# Patient Record
Sex: MALE | Race: BLACK OR AFRICAN AMERICAN | Employment: FULL TIME | URBAN - METROPOLITAN AREA
[De-identification: names, ages, dates, MRNs, and addresses within clinical notes are randomized per-mention and may not be internally consistent; named-entity substitution may affect disease eponyms.]

---

## 2017-07-06 ENCOUNTER — HOSPITAL ENCOUNTER (EMERGENCY)
Age: 22
Discharge: HOME | End: 2017-07-06
Attending: EMERGENCY MEDICINE
Payer: SELF-PAY

## 2017-07-06 VITALS
DIASTOLIC BLOOD PRESSURE: 68 MMHG | HEART RATE: 94 BPM | BODY MASS INDEX: 21.82 KG/M2 | HEIGHT: 68 IN | RESPIRATION RATE: 16 BRPM | OXYGEN SATURATION: 97 % | TEMPERATURE: 98.6 F | WEIGHT: 144 LBS | SYSTOLIC BLOOD PRESSURE: 125 MMHG

## 2017-07-06 DIAGNOSIS — R09.81 NASAL CONGESTION: ICD-10-CM

## 2017-07-06 DIAGNOSIS — J02.9 SORE THROAT: Primary | ICD-10-CM

## 2017-07-06 LAB
DEPRECATED S PYO AG THROAT QL EIA: NEGATIVE
FLUAV AG NPH QL IA: NEGATIVE
FLUBV AG NPH QL IA: NEGATIVE

## 2017-07-06 PROCEDURE — 74011250637 HC RX REV CODE- 250/637: Performed by: EMERGENCY MEDICINE

## 2017-07-06 PROCEDURE — 87081 CULTURE SCREEN ONLY: CPT | Performed by: EMERGENCY MEDICINE

## 2017-07-06 PROCEDURE — 99283 EMERGENCY DEPT VISIT LOW MDM: CPT | Performed by: EMERGENCY MEDICINE

## 2017-07-06 PROCEDURE — 87804 INFLUENZA ASSAY W/OPTIC: CPT | Performed by: EMERGENCY MEDICINE

## 2017-07-06 PROCEDURE — 87880 STREP A ASSAY W/OPTIC: CPT | Performed by: EMERGENCY MEDICINE

## 2017-07-06 RX ORDER — IBUPROFEN 600 MG/1
600 TABLET ORAL ONCE
Status: COMPLETED | OUTPATIENT
Start: 2017-07-06 | End: 2017-07-06

## 2017-07-06 RX ADMIN — IBUPROFEN 600 MG: 600 TABLET ORAL at 20:20

## 2017-07-06 NOTE — LETTER
[facility] EMERGENCY DEPT One [address] [address] 
[phone] Work/School Note Date: 7/6/2017 To Whom It May concern: 
 
Roman Miller was seen and treated today in the emergency room by the following provider(s): 
Attending Provider: Rick Singh MD.   
 
Roman Miller may return to work on 7/7/17. Sincerely, Haydee Noonan RN

## 2017-07-07 NOTE — ED PROVIDER NOTES
HPI Comments: Patient is a 64-year-old male who presents with congestion and sore throat. Patient states symptoms began 2 days ago, gradually worsening, states pain with swallowing, runny nose, mild cough. He denies any chest pain, shortness of breath, no abdominal pain, no nausea or vomiting, no neck pain or stiffness, no further complaints. states chills, however has not taken his temperature    Patient is a 22 y.o. male presenting with nasal congestion. The history is provided by the patient. No  was used. Nasal Congestion   Pertinent negatives include no chest pain, no abdominal pain, no headaches and no shortness of breath. No past medical history on file. No past surgical history on file. No family history on file. Social History     Social History    Marital status: SINGLE     Spouse name: N/A    Number of children: N/A    Years of education: N/A     Occupational History    Not on file. Social History Main Topics    Smoking status: Current Every Day Smoker     Packs/day: 0.25    Smokeless tobacco: Not on file    Alcohol use No    Drug use: No    Sexual activity: No     Other Topics Concern    Not on file     Social History Narrative         ALLERGIES: Review of patient's allergies indicates no known allergies. Review of Systems   Constitutional: Negative for chills and fever. HENT: Positive for congestion, postnasal drip, rhinorrhea and sore throat. Eyes: Negative for visual disturbance. Respiratory: Positive for cough. Negative for shortness of breath. Cardiovascular: Negative for chest pain and leg swelling. Gastrointestinal: Negative for abdominal pain, diarrhea, nausea and vomiting. Genitourinary: Negative for dysuria. Musculoskeletal: Negative for back pain and neck pain. Skin: Negative for rash. Neurological: Negative for weakness and headaches. Psychiatric/Behavioral: The patient is not nervous/anxious.         Vitals: 07/06/17 1835   BP: 124/67   Pulse: (!) 103   Resp: 16   Temp: 98.7 °F (37.1 °C)   SpO2: 96%   Weight: 65.3 kg (144 lb)   Height: 5' 8\" (1.727 m)            Physical Exam   Constitutional: He is oriented to person, place, and time. He appears well-developed and well-nourished. HENT:   Head: Normocephalic. Right Ear: External ear normal.   Left Ear: External ear normal.   bilateral tonsils with swelling and mild exudate, uvula midline, no trismus, no trouble swallowing or handling secretions. Eyes: Conjunctivae and EOM are normal. Pupils are equal, round, and reactive to light. Neck: Normal range of motion. Neck supple. No tracheal deviation present. No pain with range of motion of the neck in all directions   Cardiovascular: Normal rate, regular rhythm, normal heart sounds and intact distal pulses. No murmur heard. Pulmonary/Chest: Effort normal and breath sounds normal. No respiratory distress. Abdominal: Soft. He exhibits no distension. There is no tenderness. There is no rebound. nontender to palpation of abdomen throughout. Musculoskeletal: Normal range of motion. Neurological: He is alert and oriented to person, place, and time. No cranial nerve deficit. Skin: No rash noted. Nursing note and vitals reviewed.        MDM  Number of Diagnoses or Management Options  Nasal congestion: new and requires workup  Sore throat: new and requires workup     Amount and/or Complexity of Data Reviewed  Clinical lab tests: reviewed and ordered  Review and summarize past medical records: yes    Risk of Complications, Morbidity, and/or Mortality  Presenting problems: moderate  Diagnostic procedures: moderate  Management options: moderate    Patient Progress  Patient progress: stable    ED Course       Procedures    Recent Results (from the past 12 hour(s))   STREP AG SCREEN, GROUP A    Collection Time: 07/06/17  7:55 PM   Result Value Ref Range    Group A Strep Ag ID NEGATIVE  NEG     INFLUENZA A & B AG (RAPID TEST)    Collection Time: 07/06/17  7:55 PM   Result Value Ref Range    Influenza A Ag NEGATIVE  NEG      Influenza B Ag NEGATIVE  NEG            26-year-old male with sore throat and nasal congestion:    Patient is very well-appearing, no acute distress, laboratory throughout March from an room, requesting to leave and states \"I just need a doctor's excuse\". Please follow up with his primary care provider in 2-3 days for recheck or return to the emergency department with any fevers or chills, neck pain or stiffness, any chest pain, abdominal pain, nausea or vomiting, or any further concerns.

## 2017-07-07 NOTE — DISCHARGE INSTRUCTIONS
Sore Throat: Care Instructions  Your Care Instructions    Infection by bacteria or a virus causes most sore throats. Cigarette smoke, dry air, air pollution, allergies, and yelling can also cause a sore throat. Sore throats can be painful and annoying. Fortunately, most sore throats go away on their own. If you have a bacterial infection, your doctor may prescribe antibiotics. Follow-up care is a key part of your treatment and safety. Be sure to make and go to all appointments, and call your doctor if you are having problems. It's also a good idea to know your test results and keep a list of the medicines you take. How can you care for yourself at home? · If your doctor prescribed antibiotics, take them as directed. Do not stop taking them just because you feel better. You need to take the full course of antibiotics. · Gargle with warm salt water once an hour to help reduce swelling and relieve discomfort. Use 1 teaspoon of salt mixed in 1 cup of warm water. · Take an over-the-counter pain medicine, such as acetaminophen (Tylenol), ibuprofen (Advil, Motrin), or naproxen (Aleve). Read and follow all instructions on the label. · Be careful when taking over-the-counter cold or flu medicines and Tylenol at the same time. Many of these medicines have acetaminophen, which is Tylenol. Read the labels to make sure that you are not taking more than the recommended dose. Too much acetaminophen (Tylenol) can be harmful. · Drink plenty of fluids. Fluids may help soothe an irritated throat. Hot fluids, such as tea or soup, may help decrease throat pain. · Use over-the-counter throat lozenges to soothe pain. Regular cough drops or hard candy may also help. These should not be given to young children because of the risk of choking. · Do not smoke or allow others to smoke around you. If you need help quitting, talk to your doctor about stop-smoking programs and medicines.  These can increase your chances of quitting for good. · Use a vaporizer or humidifier to add moisture to your bedroom. Follow the directions for cleaning the machine. When should you call for help? Call your doctor now or seek immediate medical care if:  · You have new or worse trouble swallowing. · Your sore throat gets much worse on one side. Watch closely for changes in your health, and be sure to contact your doctor if you do not get better as expected. Where can you learn more? Go to http://royce-tyler.info/. Enter [phone] [id number] [phone] in the search box to learn more about \"Sore Throat: Care Instructions. \"  Current as of: July 29, 2016  Content Version: 11.3  © 0123-5164 [employer]. Care instructions adapted under license by [employer] (which disclaims liability or warranty for this information). If you have questions about a medical condition or this instruction, always ask your healthcare professional. [address] any warranty or liability for your use of this information.

## 2017-07-07 NOTE — ED NOTES
Pt discharged instructions given pt vu to instructions pt in no acute distress at discharge.  Pt ambulatory on discharge

## 2017-07-09 LAB
BACTERIA SPEC CULT: ABNORMAL
SERVICE CMNT-IMP: ABNORMAL

## 2017-07-26 ENCOUNTER — HOSPITAL ENCOUNTER (EMERGENCY)
Age: 22
Discharge: HOME | End: 2017-07-26
Attending: EMERGENCY MEDICINE
Payer: SELF-PAY

## 2017-07-26 VITALS
OXYGEN SATURATION: 97 % | DIASTOLIC BLOOD PRESSURE: 74 MMHG | BODY MASS INDEX: 21.82 KG/M2 | HEART RATE: 80 BPM | TEMPERATURE: 97.8 F | SYSTOLIC BLOOD PRESSURE: 118 MMHG | WEIGHT: 144 LBS | HEIGHT: 68 IN | RESPIRATION RATE: 16 BRPM

## 2017-07-26 DIAGNOSIS — J01.90 ACUTE SINUSITIS, RECURRENCE NOT SPECIFIED, UNSPECIFIED LOCATION: Primary | ICD-10-CM

## 2017-07-26 PROCEDURE — 99282 EMERGENCY DEPT VISIT SF MDM: CPT | Performed by: EMERGENCY MEDICINE

## 2017-07-26 RX ORDER — LORATADINE AND PSEUDOEPHEDRINE 10; 240 MG/1; MG/1
1 TABLET, EXTENDED RELEASE ORAL DAILY
Qty: 10 TAB | Refills: 0 | Status: ON HOLD | OUTPATIENT
Start: 2017-07-26 | End: 2020-03-10

## 2017-07-26 NOTE — LETTER
[facility] [place] [place] [address] [address] 
[phone] Work/School Note Date: 7/26/2017 To Whom It May concern: 
 
Cheryl Forbes was seen and treated today in the emergency room by the following provider(s): 
Attending Provider: Johana Arreola MD.   
 
Cheryl Forbes {Return to school/sport/work: 7/27/2017 Sincerely, Johana Arreola MD

## 2017-07-26 NOTE — ED TRIAGE NOTES
Pt arrives complaining of cold and flu like symptoms. Pt states he took tylenol to help but denies any other medications for relief. Pt states he's had a headache and a stuffy nose. Pt alert and oriented in triage.

## 2017-07-27 NOTE — ED PROVIDER NOTES
HPI Comments: Patient is a 41-year-old male who is otherwise healthy comes to the ER complaining of 2 days of generalized malaise and fatigue. He is also having a lot of sinus pressure and upper respiratory congestion. He states his nose was running yesterday but just feels stuffed up to date. He denies sore throat. He denies cough. He denies fevers. Patient is a 22 y.o. male presenting with nasal congestion. The history is provided by the patient. Nasal Congestion   This is a new problem. The current episode started 2 days ago. The problem occurs constantly. The problem has not changed since onset. Pertinent negatives include no chest pain, no abdominal pain, no headaches and no shortness of breath. The symptoms are aggravated by sneezing and swallowing. He has tried nothing for the symptoms. History reviewed. No pertinent past medical history. History reviewed. No pertinent surgical history. History reviewed. No pertinent family history. Social History     Social History    Marital status: SINGLE     Spouse name: N/A    Number of children: N/A    Years of education: N/A     Occupational History    Not on file. Social History Main Topics    Smoking status: Current Every Day Smoker     Packs/day: 0.25    Smokeless tobacco: Not on file    Alcohol use No    Drug use: No    Sexual activity: No     Other Topics Concern    Not on file     Social History Narrative         ALLERGIES: Review of patient's allergies indicates no known allergies. Review of Systems   Constitutional: Negative for chills and fever. HENT: Positive for congestion, postnasal drip and sinus pressure. Negative for ear discharge, ear pain, facial swelling, mouth sores, nosebleeds and sore throat. Eyes: Negative. Respiratory: Negative for shortness of breath. Cardiovascular: Negative for chest pain. Gastrointestinal: Negative for abdominal pain. Endocrine: Negative. Genitourinary: Negative. Musculoskeletal: Negative. Neurological: Negative for headaches. Vitals:    07/26/17 1930   BP: 118/74   Pulse: 80   Resp: 16   Temp: 97.8 °F (36.6 °C)   SpO2: 97%   Weight: 65.3 kg (144 lb)   Height: 5' 8\" (1.727 m)            Physical Exam   Constitutional: He appears well-developed and well-nourished. HENT:   Head: Normocephalic and atraumatic. Mouth/Throat: No oropharyngeal exudate. Postnasal drainage,  Mild maxillary sinus tenderness   Eyes: EOM are normal. Pupils are equal, round, and reactive to light. Neck: Normal range of motion. Neck supple. Cardiovascular: Normal rate and regular rhythm. Pulmonary/Chest: Effort normal and breath sounds normal.   Abdominal: Soft. Bowel sounds are normal.   Lymphadenopathy:     He has no cervical adenopathy. Skin: Skin is warm and dry. No rash noted. Nursing note and vitals reviewed. MDM  Number of Diagnoses or Management Options  Diagnosis management comments: Differential diagnosis includes upper respiratory infection, viral syndrome, sinusitis, pharyngitis    Risk of Complications, Morbidity, and/or Mortality  Presenting problems: minimal  Diagnostic procedures: minimal  Management options: minimal    Patient Progress  Patient progress: stable    ED Course   Voice dictation software was used during the making of this note. This software is not perfect and grammatical and other typographical errors may be present. This note has been proofread, but may still contain errors.   Alessandra Padilla MD; 7/26/2017 @8:23 PM   ===================================================================        Procedures

## 2017-07-27 NOTE — DISCHARGE INSTRUCTIONS
Sinusitis: Care Instructions  Your Care Instructions    Sinusitis is an infection of the lining of the sinus cavities in your head. Sinusitis often follows a cold. It causes pain and pressure in your head and face. In most cases, sinusitis gets better on its own in 1 to 2 weeks. But some mild symptoms may last for several weeks. Sometimes antibiotics are needed. Follow-up care is a key part of your treatment and safety. Be sure to make and go to all appointments, and call your doctor if you are having problems. It's also a good idea to know your test results and keep a list of the medicines you take. How can you care for yourself at home? · Take an over-the-counter pain medicine, such as acetaminophen (Tylenol), ibuprofen (Advil, Motrin), or naproxen (Aleve). Read and follow all instructions on the label. · If the doctor prescribed antibiotics, take them as directed. Do not stop taking them just because you feel better. You need to take the full course of antibiotics. · Be careful when taking over-the-counter cold or flu medicines and Tylenol at the same time. Many of these medicines have acetaminophen, which is Tylenol. Read the labels to make sure that you are not taking more than the recommended dose. Too much acetaminophen (Tylenol) can be harmful. · Breathe warm, moist air from a steamy shower, a hot bath, or a sink filled with hot water. Avoid cold, dry air. Using a humidifier in your home may help. Follow the directions for cleaning the machine. · Use saline (saltwater) nasal washes to help keep your nasal passages open and wash out mucus and bacteria. You can buy saline nose drops at a grocery store or drugstore. Or you can make your own at home by adding 1 teaspoon of salt and 1 teaspoon of baking soda to 2 cups of distilled water. If you make your own, fill a bulb syringe with the solution, insert the tip into your nostril, and squeeze gently. Darnell Clonts your nose.   · Put a hot, wet towel or a warm gel pack on your face 3 or 4 times a day for 5 to 10 minutes each time. · Try a decongestant nasal spray like oxymetazoline (Afrin). Do not use it for more than 3 days in a row. Using it for more than 3 days can make your congestion worse. When should you call for help? Call your doctor now or seek immediate medical care if:  · You have new or worse swelling or redness in your face or around your eyes. · You have a new or higher fever. Watch closely for changes in your health, and be sure to contact your doctor if:  · You have new or worse facial pain. · The mucus from your nose becomes thicker (like pus) or has new blood in it. · You are not getting better as expected. Where can you learn more? Go to http://royce-tyler.info/. Enter S181 in the search box to learn more about \"Sinusitis: Care Instructions. \"  Current as of: July 29, 2016  Content Version: 11.3  © 1535-8524 [employer]. Care instructions adapted under license by [employer] (which disclaims liability or warranty for this information). If you have questions about a medical condition or this instruction, always ask your healthcare professional. [place] [address] any warranty or liability for your use of this information.

## 2017-07-27 NOTE — ED NOTES
I have reviewed discharge instructions with the patient. Patient verbalizes understanding. Opportunity for questions provided. Prescriptions and work note in hand. Patient ambulatory off the unit. No distress noted at this time.  No e-sign

## 2017-08-02 ENCOUNTER — HOSPITAL ENCOUNTER (EMERGENCY)
Age: 22
Discharge: HOME | End: 2017-08-03
Attending: EMERGENCY MEDICINE
Payer: SELF-PAY

## 2017-08-02 ENCOUNTER — APPOINTMENT (OUTPATIENT)
Dept: GENERAL RADIOLOGY | Age: 22
End: 2017-08-02
Attending: EMERGENCY MEDICINE
Payer: SELF-PAY

## 2017-08-02 DIAGNOSIS — J18.9 COMMUNITY ACQUIRED PNEUMONIA: Primary | ICD-10-CM

## 2017-08-02 PROCEDURE — 74011250636 HC RX REV CODE- 250/636: Performed by: EMERGENCY MEDICINE

## 2017-08-02 PROCEDURE — 81003 URINALYSIS AUTO W/O SCOPE: CPT | Performed by: EMERGENCY MEDICINE

## 2017-08-02 PROCEDURE — 96374 THER/PROPH/DIAG INJ IV PUSH: CPT | Performed by: EMERGENCY MEDICINE

## 2017-08-02 PROCEDURE — 83690 ASSAY OF LIPASE: CPT | Performed by: EMERGENCY MEDICINE

## 2017-08-02 PROCEDURE — 80053 COMPREHEN METABOLIC PANEL: CPT | Performed by: EMERGENCY MEDICINE

## 2017-08-02 PROCEDURE — 99283 EMERGENCY DEPT VISIT LOW MDM: CPT | Performed by: EMERGENCY MEDICINE

## 2017-08-02 PROCEDURE — 71020 XR CHEST PA LAT: CPT

## 2017-08-02 PROCEDURE — 82550 ASSAY OF CK (CPK): CPT | Performed by: EMERGENCY MEDICINE

## 2017-08-02 PROCEDURE — 96361 HYDRATE IV INFUSION ADD-ON: CPT | Performed by: EMERGENCY MEDICINE

## 2017-08-02 PROCEDURE — 85025 COMPLETE CBC W/AUTO DIFF WBC: CPT | Performed by: EMERGENCY MEDICINE

## 2017-08-02 RX ORDER — KETOROLAC TROMETHAMINE 30 MG/ML
30 INJECTION, SOLUTION INTRAMUSCULAR; INTRAVENOUS
Status: COMPLETED | OUTPATIENT
Start: 2017-08-02 | End: 2017-08-02

## 2017-08-02 RX ADMIN — KETOROLAC TROMETHAMINE 30 MG: 30 INJECTION, SOLUTION INTRAMUSCULAR at 23:44

## 2017-08-02 RX ADMIN — SODIUM CHLORIDE 1000 ML: 900 INJECTION, SOLUTION INTRAVENOUS at 23:44

## 2017-08-02 NOTE — LETTER
[facility] EMERGENCY DEPT One [address] [address] 
[phone] Work/School Note Date: 8/2/2017 To Whom It May concern: 
 
Vinny Salas was seen and treated today in the emergency room by the following provider(s): 
Attending Provider: María Simpson MD.   
 
Vinny Salas may return to work on 8/5/17.  
 
Sincerely, 
 
 
 
 
María Simpson MD

## 2017-08-03 VITALS
TEMPERATURE: 99.8 F | BODY MASS INDEX: 21.82 KG/M2 | OXYGEN SATURATION: 99 % | HEART RATE: 79 BPM | DIASTOLIC BLOOD PRESSURE: 71 MMHG | RESPIRATION RATE: 18 BRPM | SYSTOLIC BLOOD PRESSURE: 116 MMHG | WEIGHT: 144 LBS | HEIGHT: 68 IN

## 2017-08-03 LAB
ALBUMIN SERPL BCP-MCNC: 3.7 G/DL (ref 3.5–5)
ALBUMIN/GLOB SERPL: 0.8 {RATIO} (ref 1.2–3.5)
ALP SERPL-CCNC: 70 U/L (ref 50–136)
ALT SERPL-CCNC: 18 U/L (ref 12–65)
ANION GAP BLD CALC-SCNC: 9 MMOL/L (ref 7–16)
AST SERPL W P-5'-P-CCNC: 23 U/L (ref 15–37)
BASOPHILS # BLD AUTO: 0 K/UL (ref 0–0.2)
BASOPHILS # BLD: 0 % (ref 0–2)
BILIRUB SERPL-MCNC: 0.4 MG/DL (ref 0.2–1.1)
BUN SERPL-MCNC: 12 MG/DL (ref 6–23)
CALCIUM SERPL-MCNC: 9 MG/DL (ref 8.3–10.4)
CHLORIDE SERPL-SCNC: 99 MMOL/L (ref 98–107)
CK SERPL-CCNC: 233 U/L (ref 21–215)
CO2 SERPL-SCNC: 30 MMOL/L (ref 21–32)
CREAT SERPL-MCNC: 0.77 MG/DL (ref 0.8–1.5)
DIFFERENTIAL METHOD BLD: ABNORMAL
EOSINOPHIL # BLD: 0.1 K/UL (ref 0–0.8)
EOSINOPHIL NFR BLD: 1 % (ref 0.5–7.8)
ERYTHROCYTE [DISTWIDTH] IN BLOOD BY AUTOMATED COUNT: 14.2 % (ref 11.9–14.6)
GLOBULIN SER CALC-MCNC: 4.6 G/DL (ref 2.3–3.5)
GLUCOSE SERPL-MCNC: 88 MG/DL (ref 65–100)
HCT VFR BLD AUTO: 41.8 % (ref 41.1–50.3)
HGB BLD-MCNC: 14.4 G/DL (ref 13.6–17.2)
IMM GRANULOCYTES # BLD: 0 K/UL (ref 0–0.5)
IMM GRANULOCYTES NFR BLD AUTO: 0.3 % (ref 0–5)
LIPASE SERPL-CCNC: 134 U/L (ref 73–393)
LYMPHOCYTES # BLD AUTO: 26 % (ref 13–44)
LYMPHOCYTES # BLD: 2.1 K/UL (ref 0.5–4.6)
MCH RBC QN AUTO: 28.3 PG (ref 26.1–32.9)
MCHC RBC AUTO-ENTMCNC: 34.4 G/DL (ref 31.4–35)
MCV RBC AUTO: 82.3 FL (ref 79.6–97.8)
MONOCYTES # BLD: 0.8 K/UL (ref 0.1–1.3)
MONOCYTES NFR BLD AUTO: 11 % (ref 4–12)
NEUTS SEG # BLD: 4.9 K/UL (ref 1.7–8.2)
NEUTS SEG NFR BLD AUTO: 62 % (ref 43–78)
PLATELET # BLD AUTO: 258 K/UL (ref 150–450)
PMV BLD AUTO: 10.2 FL (ref 10.8–14.1)
POTASSIUM SERPL-SCNC: 3.8 MMOL/L (ref 3.5–5.1)
PROT SERPL-MCNC: 8.3 G/DL (ref 6.3–8.2)
RBC # BLD AUTO: 5.08 M/UL (ref 4.23–5.67)
SODIUM SERPL-SCNC: 138 MMOL/L (ref 136–145)
WBC # BLD AUTO: 8 K/UL (ref 4.5–13.5)

## 2017-08-03 RX ORDER — AZITHROMYCIN 250 MG/1
TABLET, FILM COATED ORAL
Qty: 6 TAB | Refills: 0 | Status: SHIPPED | OUTPATIENT
Start: 2017-08-03 | End: 2017-08-09

## 2017-08-03 NOTE — ED PROVIDER NOTES
HPI Comments: Patient with increased fatigue and weakness for the past 2 weeks. Occasionally during this time his fingertips on both hands will go numb. Today while stretching he developed some right-sided upper back pain. Sharp in nature and worse with movement and deep breath. Patient is a 22 y.o. male presenting with flank pain. The history is provided by the patient. No  was used. Flank Pain    This is a new problem. The current episode started 6 to 12 hours ago. The problem has not changed since onset. The problem occurs constantly. Patient reports not work related injury. The pain is associated with no known injury. The pain is present in the right side. The quality of the pain is described as sharp. The pain does not radiate. The pain is mild. The symptoms are aggravated by bending, twisting and certain positions. Associated symptoms include weakness. Pertinent negatives include no chest pain, no fever, no numbness, no headaches, no abdominal pain, no abdominal swelling, no bowel incontinence, no perianal numbness, no bladder incontinence, no dysuria, no leg pain and no paresthesias. He has tried nothing for the symptoms. No past medical history on file. No past surgical history on file. No family history on file. Social History     Social History    Marital status: SINGLE     Spouse name: N/A    Number of children: N/A    Years of education: N/A     Occupational History    Not on file. Social History Main Topics    Smoking status: Current Every Day Smoker     Packs/day: 0.25    Smokeless tobacco: Not on file    Alcohol use No    Drug use: No    Sexual activity: No     Other Topics Concern    Not on file     Social History Narrative         ALLERGIES: Review of patient's allergies indicates no known allergies. Review of Systems   Constitutional: Negative for chills and fever. HENT: Negative for rhinorrhea and sore throat.     Eyes: Negative for pain and redness. Respiratory: Negative for chest tightness, shortness of breath and wheezing. Cardiovascular: Negative for chest pain and leg swelling. Gastrointestinal: Negative for abdominal pain, bowel incontinence, diarrhea, nausea and vomiting. Genitourinary: Positive for flank pain. Negative for bladder incontinence, dysuria and hematuria. Musculoskeletal: Positive for back pain. Negative for gait problem, neck pain and neck stiffness. Skin: Negative for color change and rash. Neurological: Positive for weakness. Negative for numbness, headaches and paresthesias. Vitals:    08/02/17 2242   BP: 118/77   Pulse: (!) 107   Resp: 18   Temp: 99.8 °F (37.7 °C)   SpO2: 97%   Weight: 65.3 kg (144 lb)   Height: 5' 8\" (1.727 m)            Physical Exam   Constitutional: He is oriented to person, place, and time. He appears well-developed and well-nourished. HENT:   Head: Normocephalic and atraumatic. Neck: Normal range of motion. Neck supple. Cardiovascular: Normal rate and regular rhythm. No murmur heard. Pulmonary/Chest: Effort normal and breath sounds normal. He has no wheezes. He exhibits no tenderness. Abdominal: Soft. Bowel sounds are normal. There is no tenderness. Musculoskeletal: Normal range of motion. He exhibits no edema or tenderness (no back TTP. ). Neurological: He is alert and oriented to person, place, and time. Skin: Skin is warm and dry. Nursing note and vitals reviewed. MDM  Number of Diagnoses or Management Options  Diagnosis management comments: Right upper lobe pneumonia. Will treat at home.        Amount and/or Complexity of Data Reviewed  Clinical lab tests: ordered and reviewed  Tests in the radiology section of CPT®: ordered and reviewed  Tests in the medicine section of CPT®: ordered and reviewed    Patient Progress  Patient progress: stable    ED Course       Procedures      Results Include:    Recent Results (from the past 24 hour(s))   CBC WITH AUTOMATED DIFF    Collection Time: 08/02/17 11:37 PM   Result Value Ref Range    WBC 8.0 4.5 - 13.5 K/uL    RBC 5.08 4.23 - 5.67 M/uL    HGB 14.4 13.6 - 17.2 g/dL    HCT 41.8 41.1 - 50.3 %    MCV 82.3 79.6 - 97.8 FL    MCH 28.3 26.1 - 32.9 PG    MCHC 34.4 31.4 - 35.0 g/dL    RDW 14.2 11.9 - 14.6 %    PLATELET 900 548 - 089 K/uL    MPV 10.2 (L) 10.8 - 14.1 FL    DF AUTOMATED      NEUTROPHILS 62 43 - 78 %    LYMPHOCYTES 26 13 - 44 %    MONOCYTES 11 4.0 - 12.0 %    EOSINOPHILS 1 0.5 - 7.8 %    BASOPHILS 0 0.0 - 2.0 %    IMMATURE GRANULOCYTES 0.3 0.0 - 5.0 %    ABS. NEUTROPHILS 4.9 1.7 - 8.2 K/UL    ABS. LYMPHOCYTES 2.1 0.5 - 4.6 K/UL    ABS. MONOCYTES 0.8 0.1 - 1.3 K/UL    ABS. EOSINOPHILS 0.1 0.0 - 0.8 K/UL    ABS. BASOPHILS 0.0 0.0 - 0.2 K/UL    ABS. IMM. GRANS. 0.0 0.0 - 0.5 K/UL   METABOLIC PANEL, COMPREHENSIVE    Collection Time: 08/02/17 11:37 PM   Result Value Ref Range    Sodium 138 136 - 145 mmol/L    Potassium 3.8 3.5 - 5.1 mmol/L    Chloride 99 98 - 107 mmol/L    CO2 30 21 - 32 mmol/L    Anion gap 9 7 - 16 mmol/L    Glucose 88 65 - 100 mg/dL    BUN 12 6 - 23 MG/DL    Creatinine 0.77 (L) 0.8 - 1.5 MG/DL    GFR est AA >60 >60 ml/min/1.73m2    GFR est non-AA >60 >60 ml/min/1.73m2    Calcium 9.0 8.3 - 10.4 MG/DL    Bilirubin, total 0.4 0.2 - 1.1 MG/DL    ALT (SGPT) 18 12 - 65 U/L    AST (SGOT) 23 15 - 37 U/L    Alk. phosphatase 70 50 - 136 U/L    Protein, total 8.3 (H) 6.3 - 8.2 g/dL    Albumin 3.7 3.5 - 5.0 g/dL    Globulin 4.6 (H) 2.3 - 3.5 g/dL    A-G Ratio 0.8 (L) 1.2 - 3.5     CK    Collection Time: 08/02/17 11:37 PM   Result Value Ref Range     (H) 21 - 215 U/L   LIPASE    Collection Time: 08/02/17 11:37 PM   Result Value Ref Range    Lipase 134 73 - 393 U/L     XR CHEST PA LAT (Final result) Result time: 08/03/17 00:50:36     Final result by Jeanette Hawkins MD (08/03/17 00:50:36)     Narrative:     EXAM:  XR CHEST PA LAT    INDICATION:   right sided pain    COMPARISON: None.     FINDINGS: PA and lateral radiographs of the chest demonstrate faint airspace  disease in the right upper lobe. The cardiac and mediastinal contours and  pulmonary vascularity are normal.  The bones and soft tissues are within normal  limits. IMPRESSION: Small focus of right upper lobe atelectasis or pneumonia.

## 2017-08-03 NOTE — DISCHARGE INSTRUCTIONS
Pneumonia: Care Instructions  Your Care Instructions    Pneumonia is an infection of the lungs. Most cases are caused by infections from bacteria or viruses. Pneumonia may be mild or very severe. If it is caused by bacteria, you will be treated with antibiotics. It may take a few weeks to a few months to recover fully from pneumonia, depending on how sick you were and whether your overall health is good. Follow-up care is a key part of your treatment and safety. Be sure to make and go to all appointments, and call your doctor if you are having problems. Its also a good idea to know your test results and keep a list of the medicines you take. How can you care for yourself at home? · Take your antibiotics exactly as directed. Do not stop taking the medicine just because you are feeling better. You need to take the full course of antibiotics. · Take your medicines exactly as prescribed. Call your doctor if you think you are having a problem with your medicine. · Get plenty of rest and sleep. You may feel weak and tired for a while, but your energy level will improve with time. · To prevent dehydration, drink plenty of fluids, enough so that your urine is light yellow or clear like water. Choose water and other caffeine-free clear liquids until you feel better. If you have kidney, heart, or liver disease and have to limit fluids, talk with your doctor before you increase the amount of fluids you drink. · Take care of your cough so you can rest. A cough that brings up mucus from your lungs is common with pneumonia. It is one way your body gets rid of the infection. But if coughing keeps you from resting or causes severe fatigue and chest-wall pain, talk to your doctor. He or she may suggest that you take a medicine to reduce the cough. · Use a vaporizer or humidifier to add moisture to your bedroom. Follow the directions for cleaning the machine. · Do not smoke or allow others to smoke around you.  Smoke will make your cough last longer. If you need help quitting, talk to your doctor about stop-smoking programs and medicines. These can increase your chances of quitting for good. · Take an over-the-counter pain medicine, such as acetaminophen (Tylenol), ibuprofen (Advil, Motrin), or naproxen (Aleve). Read and follow all instructions on the label. · Do not take two or more pain medicines at the same time unless the doctor told you to. Many pain medicines have acetaminophen, which is Tylenol. Too much acetaminophen (Tylenol) can be harmful. · If you were given a spirometer to measure how well your lungs are working, use it as instructed. This can help your doctor tell how your recovery is going. · To prevent pneumonia in the future, talk to your doctor about getting a flu vaccine (once a year) and a pneumococcal vaccine (one time only for most people). When should you call for help? Call 911 anytime you think you may need emergency care. For example, call if:  · You have severe trouble breathing. Call your doctor now or seek immediate medical care if:  · You cough up dark brown or bloody mucus (sputum). · You have new or worse trouble breathing. · You are dizzy or lightheaded, or you feel like you may faint. Watch closely for changes in your health, and be sure to contact your doctor if:  · You have a new or higher fever. · You are coughing more deeply or more often. · You are not getting better after 2 days (48 hours). · You do not get better as expected. Where can you learn more? Go to http://royce-tyler.info/. Enter [id number] in the search box to learn more about \"Pneumonia: Care Instructions. \"  Current as of: March 25, 2017  Content Version: 11.3  © 0829-5423 [employer]. Care instructions adapted under license by [employer] (which disclaims liability or warranty for this information).  If you have questions about a medical condition or this instruction, always ask your healthcare professional. [place] [address] any warranty or liability for your use of this information.

## 2017-08-03 NOTE — ED NOTES
I have reviewed medications, follow up provider options, and discharge instructions with the patient. The patient verbalized understanding. Copy of discharge information given to patient upon discharge. Prescription(s) given to patient. Patient discharged in no distress. Patient ambulatory to waiting area. No questions at this time.

## 2017-08-03 NOTE — ED NOTES
Patient awake, A&O x3. Patient states he was trying to stretch out his back today and it has been hurting since. Right upper back pain that is sharp with movement. Patient states some SOB, LS clear. Patient denies chest pain, N/V/D.  Patient also states he has felt weak for approx 2 weeks and sometimes his fingertips go numb

## 2017-08-09 ENCOUNTER — HOSPITAL ENCOUNTER (EMERGENCY)
Age: 22
Discharge: HOME | End: 2017-08-09
Attending: EMERGENCY MEDICINE
Payer: SELF-PAY

## 2017-08-09 ENCOUNTER — APPOINTMENT (OUTPATIENT)
Dept: GENERAL RADIOLOGY | Age: 22
End: 2017-08-09
Attending: EMERGENCY MEDICINE
Payer: SELF-PAY

## 2017-08-09 VITALS
BODY MASS INDEX: 21.82 KG/M2 | HEART RATE: 74 BPM | RESPIRATION RATE: 20 BRPM | DIASTOLIC BLOOD PRESSURE: 84 MMHG | WEIGHT: 144 LBS | OXYGEN SATURATION: 95 % | TEMPERATURE: 98 F | SYSTOLIC BLOOD PRESSURE: 128 MMHG | HEIGHT: 68 IN

## 2017-08-09 DIAGNOSIS — J18.9 COMMUNITY ACQUIRED PNEUMONIA: Primary | ICD-10-CM

## 2017-08-09 LAB
ALBUMIN SERPL BCP-MCNC: 3.6 G/DL (ref 3.5–5)
ALBUMIN/GLOB SERPL: 0.7 {RATIO} (ref 1.2–3.5)
ALP SERPL-CCNC: 72 U/L (ref 50–136)
ALT SERPL-CCNC: 18 U/L (ref 12–65)
ANION GAP BLD CALC-SCNC: 13 MMOL/L (ref 7–16)
AST SERPL W P-5'-P-CCNC: 32 U/L (ref 15–37)
BASOPHILS # BLD AUTO: 0 K/UL (ref 0–0.2)
BASOPHILS # BLD: 0 % (ref 0–2)
BILIRUB SERPL-MCNC: 0.3 MG/DL (ref 0.2–1.1)
BUN SERPL-MCNC: 18 MG/DL (ref 6–23)
CALCIUM SERPL-MCNC: 8.9 MG/DL (ref 8.3–10.4)
CHLORIDE SERPL-SCNC: 98 MMOL/L (ref 98–107)
CO2 SERPL-SCNC: 25 MMOL/L (ref 21–32)
CREAT SERPL-MCNC: 0.7 MG/DL (ref 0.8–1.5)
DIFFERENTIAL METHOD BLD: ABNORMAL
EOSINOPHIL # BLD: 0.1 K/UL (ref 0–0.8)
EOSINOPHIL NFR BLD: 1 % (ref 0.5–7.8)
ERYTHROCYTE [DISTWIDTH] IN BLOOD BY AUTOMATED COUNT: 13.8 % (ref 11.9–14.6)
GLOBULIN SER CALC-MCNC: 5.2 G/DL (ref 2.3–3.5)
GLUCOSE SERPL-MCNC: 89 MG/DL (ref 65–100)
HCT VFR BLD AUTO: 43.8 % (ref 41.1–50.3)
HGB BLD-MCNC: 15.1 G/DL (ref 13.6–17.2)
IMM GRANULOCYTES # BLD: 0 K/UL (ref 0–0.5)
IMM GRANULOCYTES NFR BLD AUTO: 0.3 % (ref 0–5)
LYMPHOCYTES # BLD AUTO: 30 % (ref 13–44)
LYMPHOCYTES # BLD: 2.3 K/UL (ref 0.5–4.6)
MAGNESIUM SERPL-MCNC: 2.2 MG/DL (ref 1.8–2.4)
MCH RBC QN AUTO: 28.4 PG (ref 26.1–32.9)
MCHC RBC AUTO-ENTMCNC: 34.5 G/DL (ref 31.4–35)
MCV RBC AUTO: 82.5 FL (ref 79.6–97.8)
MONOCYTES # BLD: 0.7 K/UL (ref 0.1–1.3)
MONOCYTES NFR BLD AUTO: 8 % (ref 4–12)
NEUTS SEG # BLD: 4.7 K/UL (ref 1.7–8.2)
NEUTS SEG NFR BLD AUTO: 61 % (ref 43–78)
PLATELET # BLD AUTO: 312 K/UL (ref 150–450)
PMV BLD AUTO: 9.9 FL (ref 10.8–14.1)
POTASSIUM SERPL-SCNC: 3.9 MMOL/L (ref 3.5–5.1)
PROT SERPL-MCNC: 8.8 G/DL (ref 6.3–8.2)
RBC # BLD AUTO: 5.31 M/UL (ref 4.23–5.67)
SODIUM SERPL-SCNC: 136 MMOL/L (ref 136–145)
TROPONIN I SERPL-MCNC: <0.02 NG/ML (ref 0.02–0.05)
WBC # BLD AUTO: 7.8 K/UL (ref 4.5–13.5)

## 2017-08-09 PROCEDURE — 83735 ASSAY OF MAGNESIUM: CPT | Performed by: EMERGENCY MEDICINE

## 2017-08-09 PROCEDURE — 96360 HYDRATION IV INFUSION INIT: CPT | Performed by: EMERGENCY MEDICINE

## 2017-08-09 PROCEDURE — 85025 COMPLETE CBC W/AUTO DIFF WBC: CPT | Performed by: EMERGENCY MEDICINE

## 2017-08-09 PROCEDURE — 84484 ASSAY OF TROPONIN QUANT: CPT | Performed by: EMERGENCY MEDICINE

## 2017-08-09 PROCEDURE — 80053 COMPREHEN METABOLIC PANEL: CPT | Performed by: EMERGENCY MEDICINE

## 2017-08-09 PROCEDURE — 74011250637 HC RX REV CODE- 250/637: Performed by: EMERGENCY MEDICINE

## 2017-08-09 PROCEDURE — 71020 XR CHEST PA LAT: CPT

## 2017-08-09 PROCEDURE — 74011250636 HC RX REV CODE- 250/636: Performed by: EMERGENCY MEDICINE

## 2017-08-09 PROCEDURE — 93005 ELECTROCARDIOGRAM TRACING: CPT | Performed by: EMERGENCY MEDICINE

## 2017-08-09 PROCEDURE — 99284 EMERGENCY DEPT VISIT MOD MDM: CPT | Performed by: EMERGENCY MEDICINE

## 2017-08-09 RX ORDER — ACETAMINOPHEN 325 MG/1
650 TABLET ORAL
Status: COMPLETED | OUTPATIENT
Start: 2017-08-09 | End: 2017-08-09

## 2017-08-09 RX ORDER — SULFAMETHOXAZOLE AND TRIMETHOPRIM 800; 160 MG/1; MG/1
1 TABLET ORAL 2 TIMES DAILY
Qty: 14 TAB | Refills: 0 | Status: SHIPPED | OUTPATIENT
Start: 2017-08-09 | End: 2017-08-16

## 2017-08-09 RX ORDER — HYDROCODONE BITARTRATE AND HOMATROPINE METHYLBROMIDE 1.5; 5 MG/5ML; MG/5ML
5 SYRUP ORAL 4 TIMES DAILY
Qty: 120 ML | Refills: 0 | Status: ON HOLD | OUTPATIENT
Start: 2017-08-09 | End: 2020-03-10

## 2017-08-09 RX ADMIN — ACETAMINOPHEN 650 MG: 325 TABLET, FILM COATED ORAL at 21:59

## 2017-08-09 RX ADMIN — SODIUM CHLORIDE 1000 ML: 900 INJECTION, SOLUTION INTRAVENOUS at 20:46

## 2017-08-09 NOTE — Clinical Note
Discontinue azithromycin Call and arrange follow-up with a regular physician Take medications as prescribed Drink plenty of fluids Return to the ER for any new or worsening symptoms

## 2017-08-09 NOTE — LETTER
[facility] [facility] EMERGENCY DEPT [address] [address] 
[phone] Work/School Note Date: 8/9/2017 To Whom It May concern: 
 
Severo Palacios was seen and treated today in the emergency room by the following provider(s): 
Attending Provider: Adelina Mckeon MD.   
 
Severo Palacios may return to work on Monday August 14th or when cleared by primary care physician with whom he is following up. Sincerely, Ciro Hall RN

## 2017-08-10 LAB
ATRIAL RATE: 87 BPM
CALCULATED P AXIS, ECG09: 86 DEGREES
CALCULATED R AXIS, ECG10: 89 DEGREES
CALCULATED T AXIS, ECG11: 73 DEGREES
DIAGNOSIS, 93000: NORMAL
P-R INTERVAL, ECG05: 142 MS
Q-T INTERVAL, ECG07: 330 MS
QRS DURATION, ECG06: 84 MS
QTC CALCULATION (BEZET), ECG08: 397 MS
VENTRICULAR RATE, ECG03: 87 BPM

## 2017-08-10 NOTE — ED PROVIDER NOTES
HPI Comments: Patient presents to the ER complaining of generalized fatigue and right-sided chest pain. Patient reports he's had issues with fatigue and malaise as well as numbness and tingling in his hands for over 3 weeks. Was seen in the ER approximately 1 week ago and diagnosed with a right-sided pneumonia. States he's been taking antibiotics. Denies any significant cough or fevers. States some pain in his right side of her chest with deep breathing. Patient is a 22 y.o. male presenting with fatigue. The history is provided by the patient. Fatigue   This is a recurrent problem. The current episode started more than 1 week ago. The problem has not changed since onset. Pertinent negatives include no focal weakness, no speech difficulty, no movement disorder, no agitation and no unresponsiveness. Pertinent negatives include no shortness of breath, no nausea and no bladder incontinence. History reviewed. No pertinent past medical history. History reviewed. No pertinent surgical history. History reviewed. No pertinent family history. Social History     Social History    Marital status: SINGLE     Spouse name: N/A    Number of children: N/A    Years of education: N/A     Occupational History    Not on file. Social History Main Topics    Smoking status: Current Every Day Smoker     Packs/day: 0.25    Smokeless tobacco: Never Used    Alcohol use No    Drug use: Yes     Special: Marijuana    Sexual activity: No     Other Topics Concern    Not on file     Social History Narrative         ALLERGIES: Review of patient's allergies indicates no known allergies. Review of Systems   Constitutional: Positive for fatigue. HENT: Negative for congestion and dental problem. Eyes: Negative for photophobia, redness and visual disturbance. Respiratory: Negative for chest tightness, shortness of breath and stridor. Cardiovascular: Negative for palpitations and leg swelling. Gastrointestinal: Negative for abdominal pain and nausea. Endocrine: Negative for polydipsia, polyphagia and polyuria. Genitourinary: Negative for bladder incontinence, flank pain, frequency and urgency. Musculoskeletal: Negative for back pain, gait problem and neck pain. Skin: Negative for pallor. Allergic/Immunologic: Negative for food allergies and immunocompromised state. Neurological: Positive for numbness. Negative for focal weakness, speech difficulty and light-headedness. Hematological: Negative for adenopathy. Does not bruise/bleed easily. Psychiatric/Behavioral: Negative for agitation. All other systems reviewed and are negative. Vitals:    08/09/17 2000 08/09/17 2026 08/09/17 2027   BP: 127/75 125/81    Pulse: 91     Resp: 16     Temp: 98.8 °F (37.1 °C)     SpO2: 98%  97%   Weight: 65.3 kg (144 lb)     Height: 5' 8\" (1.727 m)              Physical Exam   Constitutional: He is oriented to person, place, and time. He appears well-developed and well-nourished. HENT:   Head: Normocephalic and atraumatic. Mouth/Throat: Oropharynx is clear and moist.   Eyes: Conjunctivae and EOM are normal. Pupils are equal, round, and reactive to light. Neck: Normal range of motion. Neck supple. No tracheal deviation present. No thyromegaly present. Cardiovascular: Normal rate, regular rhythm, normal heart sounds and intact distal pulses. Exam reveals no gallop and no friction rub. No murmur heard. Pulmonary/Chest: Effort normal and breath sounds normal. He has no wheezes. Abdominal: Soft. Bowel sounds are normal. He exhibits no distension. There is no tenderness. Musculoskeletal: Normal range of motion. He exhibits no edema or deformity. Neurological: He is alert and oriented to person, place, and time. He has normal reflexes. No cranial nerve deficit. Nursing note and vitals reviewed.        MDM  Number of Diagnoses or Management Options  Diagnosis management comments: Patient is not tachycardic or hypoxic here  Will repeat chest x-ray here to rule out worsening pneumonia, effusion or volume overload    9:42 PM  Chest x-ray does reveal a patchy right upper lobe infiltrate. Appears about the same compared to previous chest x-ray. Discussing the patient's history,  Nurse reports patient did tell him that he is known to be HIV positive. We'll change antibiotics from azithromycin to Bactrim. Vitals remained stable including no hypoxia, tachycardia or tachypnea  Ultimately, patient needs follow-up with her primary care physician as well as likely needs an infectious disease doctor           Amount and/or Complexity of Data Reviewed  Clinical lab tests: ordered and reviewed  Tests in the radiology section of CPT®: ordered and reviewed    Risk of Complications, Morbidity, and/or Mortality  Presenting problems: moderate  Diagnostic procedures: moderate  Management options: moderate    Patient Progress  Patient progress: stable    ED Course       Procedures           Results Include:    Recent Results (from the past 24 hour(s))   CBC WITH AUTOMATED DIFF    Collection Time: 08/09/17  8:20 PM   Result Value Ref Range    WBC 7.8 4.5 - 13.5 K/uL    RBC 5.31 4.23 - 5.67 M/uL    HGB 15.1 13.6 - 17.2 g/dL    HCT 43.8 41.1 - 50.3 %    MCV 82.5 79.6 - 97.8 FL    MCH 28.4 26.1 - 32.9 PG    MCHC 34.5 31.4 - 35.0 g/dL    RDW 13.8 11.9 - 14.6 %    PLATELET 756 539 - 772 K/uL    MPV 9.9 (L) 10.8 - 14.1 FL    DF AUTOMATED      NEUTROPHILS 61 43 - 78 %    LYMPHOCYTES 30 13 - 44 %    MONOCYTES 8 4.0 - 12.0 %    EOSINOPHILS 1 0.5 - 7.8 %    BASOPHILS 0 0.0 - 2.0 %    IMMATURE GRANULOCYTES 0.3 0.0 - 5.0 %    ABS. NEUTROPHILS 4.7 1.7 - 8.2 K/UL    ABS. LYMPHOCYTES 2.3 0.5 - 4.6 K/UL    ABS. MONOCYTES 0.7 0.1 - 1.3 K/UL    ABS. EOSINOPHILS 0.1 0.0 - 0.8 K/UL    ABS. BASOPHILS 0.0 0.0 - 0.2 K/UL    ABS. IMM.  GRANS. 0.0 0.0 - 0.5 K/UL   METABOLIC PANEL, COMPREHENSIVE    Collection Time: 08/09/17  8:20 PM   Result Value Ref Range    Sodium 136 136 - 145 mmol/L    Potassium 3.9 3.5 - 5.1 mmol/L    Chloride 98 98 - 107 mmol/L    CO2 25 21 - 32 mmol/L    Anion gap 13 7 - 16 mmol/L    Glucose 89 65 - 100 mg/dL    BUN 18 6 - 23 MG/DL    Creatinine 0.70 (L) 0.8 - 1.5 MG/DL    GFR est AA >60 >60 ml/min/1.73m2    GFR est non-AA >60 >60 ml/min/1.73m2    Calcium 8.9 8.3 - 10.4 MG/DL    Bilirubin, total 0.3 0.2 - 1.1 MG/DL    ALT (SGPT) 18 12 - 65 U/L    AST (SGOT) 32 15 - 37 U/L    Alk.  phosphatase 72 50 - 136 U/L    Protein, total 8.8 (H) 6.3 - 8.2 g/dL    Albumin 3.6 3.5 - 5.0 g/dL    Globulin 5.2 (H) 2.3 - 3.5 g/dL    A-G Ratio 0.7 (L) 1.2 - 3.5     TROPONIN I    Collection Time: 08/09/17  8:20 PM   Result Value Ref Range    Troponin-I, Qt. <0.02 (L) 0.02 - 0.05 NG/ML

## 2017-08-10 NOTE — ED NOTES
I have reviewed discharge instructions with the patient. The patient verbalized understanding. Patient to follow up with PMD as referred and RTED with any changes/concerns. Patient expresses understanding. Patient ambulatory from ED in NAD with Rx x 2. Patient advised that they received medications (either in ED or by Rx) which could cause them to be somnolent. Patient advised that they shouldn't drive or operate machinery and should use caution to avoid falls while under the effects (8-12 hours after last dosage) of said medicine.

## 2017-08-10 NOTE — DISCHARGE INSTRUCTIONS
Your Emergency Department Team appreciates your trust in us. It was unfortunate you had to seek care today, but we are glad you choose our Hospital for your Emergency Medical needs. We strive to provide excellent care. Our goal is to exceed your expectations. If you are not satisfied with your care today, please let the treatment team know before you leave the facility. We would like the chance to rectify any problems now, if possible. Medicine is an imperfect science. Even in the most careful hands, a patients condition may change; or new unexpected symptoms may develop. Should your symptoms worsen or new problems develop, seek follow up care right away. If your symptoms are severe, such as severe pain, shortness of breath, unexplained fever, chest pain, severe abdominal pain, or any serious symptom, please return to THIS Emergency Department immediately. If the Emergency Department team wants you to follow up with a specialist, his or her contact information will follow on the discharge instructions. Of course, your primary care provider (or Family Doctor) will be a great resource for your medical needs. We encourage you to seek follow up care in the near future. Even if you are to see a specialist, your family physician will want to be involved in your medical care. Should you not have a primary care physician; the Emergency Department will provide you with the name and contact information of an appropriate physician. It will be your responsibility to contact the on call physician's office and arrange an appointment. This physician might not participate in your insurance plan. If not, other resources are listed.     Other resources include these local clinics:    [facility] [phone]   [facility] [address]   [facility] [place] [address] [place] [address] [address] [facility] for [address] Medicaid Office Devin Macario Department [phone]   [facility] Department [facility] [address]. [phone]   [address] [facility] Department [phone]   [address] Hari Len[place] [facility] [phone]   [facility] CHEMICAL [facility] ([address]) [place] [address] [phone]   [address] [facility] [place] [address] [phone]         Please remember, Emergency Department care is no substitute for quality primary care. The Emergency Department cannot adequately manage chronic health problems such as hypertension, arthritis, chronic pain, lung disease, heart disease, and diabetes (to mention but a few). We cannot be expected to screen for all possible illnesses in a single visit. It is vital, regardless of your condition, that you have regular, routine health care. Although we are always available to serve your emergency needs, we are but one building block in the wall of your health and well being. But remember, if your problem does not improve as expected and you are not able to find follow up care, the Emergency Department is always available to re-evaluate your condition. However, the Emergency Department physicians will not routinely refill pain medications, sedatives, or anxiety medications. The Emergency Physicians will not be responsible for completion of disability statements, long term care insurance forms, or Family and Medical Leave Act [facility]) documents. The Emergency Department does not routinely kirstie extended work release statements. More specific instructions follow. In many cases, there are Internet links for further information. Again, thank you for your trust in us today. God Bless you and get well soon. Pneumonia: Care Instructions  Your Care Instructions    Pneumonia is an infection of the lungs. Most cases are caused by infections from bacteria or viruses. Pneumonia may be mild or very severe. If it is caused by bacteria, you will be treated with antibiotics. It may take a few weeks to a few months to recover fully from pneumonia, depending on how sick you were and whether your overall health is good. Follow-up care is a key part of your treatment and safety. Be sure to make and go to all appointments, and call your doctor if you are having problems. Its also a good idea to know your test results and keep a list of the medicines you take. How can you care for yourself at home? · Take your antibiotics exactly as directed. Do not stop taking the medicine just because you are feeling better. You need to take the full course of antibiotics. · Take your medicines exactly as prescribed. Call your doctor if you think you are having a problem with your medicine. · Get plenty of rest and sleep. You may feel weak and tired for a while, but your energy level will improve with time. · To prevent dehydration, drink plenty of fluids, enough so that your urine is light yellow or clear like water. Choose water and other caffeine-free clear liquids until you feel better. If you have kidney, heart, or liver disease and have to limit fluids, talk with your doctor before you increase the amount of fluids you drink. · Take care of your cough so you can rest. A cough that brings up mucus from your lungs is common with pneumonia. It is one way your body gets rid of the infection. But if coughing keeps you from resting or causes severe fatigue and chest-wall pain, talk to your doctor. He or she may suggest that you take a medicine to reduce the cough. · Use a vaporizer or humidifier to add moisture to your bedroom.  Follow the directions for cleaning the machine. · Do not smoke or allow others to smoke around you. Smoke will make your cough last longer. If you need help quitting, talk to your doctor about stop-smoking programs and medicines. These can increase your chances of quitting for good. · Take an over-the-counter pain medicine, such as acetaminophen (Tylenol), ibuprofen (Advil, Motrin), or naproxen (Aleve). Read and follow all instructions on the label. · Do not take two or more pain medicines at the same time unless the doctor told you to. Many pain medicines have acetaminophen, which is Tylenol. Too much acetaminophen (Tylenol) can be harmful. · If you were given a spirometer to measure how well your lungs are working, use it as instructed. This can help your doctor tell how your recovery is going. · To prevent pneumonia in the future, talk to your doctor about getting a flu vaccine (once a year) and a pneumococcal vaccine (one time only for most people). When should you call for help? Call 911 anytime you think you may need emergency care. For example, call if:  · You have severe trouble breathing. Call your doctor now or seek immediate medical care if:  · You cough up dark brown or bloody mucus (sputum). · You have new or worse trouble breathing. · You are dizzy or lightheaded, or you feel like you may faint. Watch closely for changes in your health, and be sure to contact your doctor if:  · You have a new or higher fever. · You are coughing more deeply or more often. · You are not getting better after 2 days (48 hours). · You do not get better as expected. Where can you learn more? Go to http://royce-tyler.info/. Enter [id number] in the search box to learn more about \"Pneumonia: Care Instructions. \"  Current as of: March 25, 2017  Content Version: 11.3  © 7058-0381 [employer].  Care instructions adapted under license by [employer] (which disclaims liability or warranty for this information). If you have questions about a medical condition or this instruction, always ask your healthcare professional. [place] [address] any warranty or liability for your use of this information.

## 2017-08-10 NOTE — ED NOTES
Patient to ED with c/c intermittent CP. Patient reports recent hx of pneumonia, states finished ABX yesterday. Patient reports associated generalized malaise, weakness to extremities x 3 weeks. Patient HIV+, not currently utilizing antivirals. Patient in NAD at current.

## 2017-11-08 ENCOUNTER — HOSPITAL ENCOUNTER (EMERGENCY)
Age: 22
Discharge: HOME | End: 2017-11-08
Attending: EMERGENCY MEDICINE
Payer: SELF-PAY

## 2017-11-08 VITALS
HEIGHT: 68 IN | WEIGHT: 148 LBS | BODY MASS INDEX: 22.43 KG/M2 | HEART RATE: 78 BPM | OXYGEN SATURATION: 98 % | SYSTOLIC BLOOD PRESSURE: 136 MMHG | RESPIRATION RATE: 18 BRPM | TEMPERATURE: 98.8 F | DIASTOLIC BLOOD PRESSURE: 94 MMHG

## 2017-11-08 DIAGNOSIS — L02.91 ABSCESS: Primary | ICD-10-CM

## 2017-11-08 PROCEDURE — 99281 EMR DPT VST MAYX REQ PHY/QHP: CPT | Performed by: EMERGENCY MEDICINE

## 2017-11-08 PROCEDURE — 75810000289 HC I&D ABSCESS SIMP/COMP/MULT: Performed by: EMERGENCY MEDICINE

## 2017-11-08 PROCEDURE — 99282 EMERGENCY DEPT VISIT SF MDM: CPT | Performed by: EMERGENCY MEDICINE

## 2017-11-08 RX ORDER — LIDOCAINE HYDROCHLORIDE 10 MG/ML
10 INJECTION INFILTRATION; PERINEURAL
Status: DISCONTINUED | OUTPATIENT
Start: 2017-11-08 | End: 2017-11-09 | Stop reason: HOSPADM

## 2017-11-08 RX ORDER — CLINDAMYCIN HYDROCHLORIDE 300 MG/1
300 CAPSULE ORAL 4 TIMES DAILY
Qty: 28 CAP | Refills: 0 | Status: SHIPPED | OUTPATIENT
Start: 2017-11-08 | End: 2017-11-15

## 2017-11-08 RX ORDER — TRAMADOL HYDROCHLORIDE 50 MG/1
50 TABLET ORAL
Qty: 20 TAB | Refills: 0 | Status: ON HOLD | OUTPATIENT
Start: 2017-11-08 | End: 2020-03-10

## 2017-11-09 NOTE — ED PROVIDER NOTES
Patient is a 25 y.o. male presenting with skin problem. The history is provided by the patient. Skin Problem    This is a new problem. The current episode started more than 2 days ago. The problem has not changed since onset. Associated with: prior occurance. There has been no fever. The rash is present on the groin. The pain is at a severity of 8/10. The pain is severe. The pain has been constant since onset. Associated symptoms include pain. Pertinent negatives include no blisters, no itching, no weeping and no hives. He has tried nothing for the symptoms. The treatment provided no relief. No past medical history on file. No past surgical history on file. No family history on file. Social History     Social History    Marital status: SINGLE     Spouse name: N/A    Number of children: N/A    Years of education: N/A     Occupational History    Not on file. Social History Main Topics    Smoking status: Current Every Day Smoker     Packs/day: 0.25    Smokeless tobacco: Never Used    Alcohol use No    Drug use: Yes     Special: Marijuana    Sexual activity: No     Other Topics Concern    Not on file     Social History Narrative         ALLERGIES: Review of patient's allergies indicates no known allergies. Review of Systems   Constitutional: Negative for chills and fever. Skin: Negative for itching, rash and wound. All other systems reviewed and are negative. Vitals:    11/08/17 1953   BP: (!) 136/94   Pulse: 78   Resp: 18   Temp: 98.8 °F (37.1 °C)   SpO2: 98%   Weight: 67.1 kg (148 lb)   Height: 5' 8\" (1.727 m)            Physical Exam   Constitutional: He is oriented to person, place, and time. He appears well-developed and well-nourished. No distress. HENT:   Head: Normocephalic and atraumatic. Eyes: Conjunctivae and EOM are normal. Pupils are equal, round, and reactive to light.    Genitourinary:   Genitourinary Comments: Abscess to left side of scrotum; pointing Neurological: He is alert and oriented to person, place, and time. Skin: Skin is warm and dry. Psychiatric: He has a normal mood and affect. His behavior is normal.   Nursing note and vitals reviewed. MDM  Number of Diagnoses or Management Options  Abscess:   Risk of Complications, Morbidity, and/or Mortality  Presenting problems: low  Diagnostic procedures: minimal  Management options: low    Patient Progress  Patient progress: stable    ED Course       I&D Ananth Simple  Date/Time: 11/8/2017 8:53 PM  Performed by: Mervat Loza by: Shabnam Persons     Consent:     Consent obtained:  Verbal    Consent given by:  Patient    Risks discussed:  Bleeding    Alternatives discussed:  No treatment  Location:     Type:  Abscess    Location: left scrotum. Pre-procedure details:     Skin preparation:  Betadine  Anesthesia (see MAR for exact dosages): Anesthesia method:  Local infiltration    Local anesthetic:  Lidocaine 1% w/o epi  Procedure type:     Complexity:  Simple  Procedure details:     Needle aspiration: no      Incision types:  Stab incision    Incision depth:  Subcutaneous    Scalpel blade:  11    Wound management:  Probed and deloculated    Drainage:  Purulent    Drainage amount: Moderate    Wound treatment:  Wound left open    Packing materials:  None  Post-procedure details:     Patient tolerance of procedure:   Tolerated well, no immediate complications

## 2017-11-09 NOTE — DISCHARGE INSTRUCTIONS

## 2018-01-02 ENCOUNTER — HOSPITAL ENCOUNTER (EMERGENCY)
Age: 23
Discharge: HOME | End: 2018-01-03
Attending: EMERGENCY MEDICINE
Payer: SELF-PAY

## 2018-01-02 ENCOUNTER — APPOINTMENT (OUTPATIENT)
Dept: GENERAL RADIOLOGY | Age: 23
End: 2018-01-02
Attending: EMERGENCY MEDICINE
Payer: SELF-PAY

## 2018-01-02 VITALS
RESPIRATION RATE: 16 BRPM | WEIGHT: 152 LBS | OXYGEN SATURATION: 100 % | HEART RATE: 67 BPM | HEIGHT: 68 IN | BODY MASS INDEX: 23.04 KG/M2 | DIASTOLIC BLOOD PRESSURE: 85 MMHG | TEMPERATURE: 97.7 F | SYSTOLIC BLOOD PRESSURE: 138 MMHG

## 2018-01-02 DIAGNOSIS — J06.9 ACUTE UPPER RESPIRATORY INFECTION: Primary | ICD-10-CM

## 2018-01-02 PROCEDURE — 71045 X-RAY EXAM CHEST 1 VIEW: CPT

## 2018-01-02 PROCEDURE — 87804 INFLUENZA ASSAY W/OPTIC: CPT | Performed by: EMERGENCY MEDICINE

## 2018-01-02 PROCEDURE — 87081 CULTURE SCREEN ONLY: CPT | Performed by: EMERGENCY MEDICINE

## 2018-01-02 PROCEDURE — 99282 EMERGENCY DEPT VISIT SF MDM: CPT | Performed by: EMERGENCY MEDICINE

## 2018-01-02 PROCEDURE — 87880 STREP A ASSAY W/OPTIC: CPT | Performed by: EMERGENCY MEDICINE

## 2018-01-02 NOTE — Clinical Note
Use over-the-counter medications for symptomatic treatment Saline nasal rinses may help with some of scratchy throat and posterior drainage Tylenol or ibuprofen for discomfort

## 2018-01-02 NOTE — LETTER
[address] [facility] EMERGENCY DEPT One [address] [id number] [phone] Work/School Note Date: 1/2/2018 To Whom It May concern: 
 
Juan Coleman was seen and treated today in the emergency room by the following provider(s): 
Attending Provider: Francine Sanchez MD.   
 
Juan Coleman Special Instructions: Work excuse January 2 and January 3, may return January 3 if significant improvement Sincerely, 
 
 
 
 
Francine Sanchez MD

## 2018-01-03 NOTE — ED PROVIDER NOTES
HPI Comments: Here with multiple days of upper respiratory symptoms that are somewhat worse today. He left his job early due to this. This had a cough but is not been productive sputum describes his throat is being scratchy  He had some vomiting that occurred for several days but this once again seems better. Overall he is trending last symptomatic but states scratchy throat remains fairly constant. No large nodes. On HIV meds and overall states is stable    Patient is a 25 y.o. male presenting with cough. The history is provided by the patient. Cough   The current episode started more than 2 days ago. There has been no fever. No past medical history on file. No past surgical history on file. No family history on file. Social History     Social History    Marital status: SINGLE     Spouse name: N/A    Number of children: N/A    Years of education: N/A     Occupational History    Not on file. Social History Main Topics    Smoking status: Current Every Day Smoker     Packs/day: 0.25    Smokeless tobacco: Never Used    Alcohol use No    Drug use: Yes     Special: Marijuana    Sexual activity: No     Other Topics Concern    Not on file     Social History Narrative         ALLERGIES: Review of patient's allergies indicates no known allergies. Review of Systems   Respiratory: Positive for cough. All other systems reviewed and are negative. Vitals:    01/02/18 2035   BP: (!) 152/93   Pulse: 70   Resp: 20   Temp: 97.7 °F (36.5 °C)   SpO2: 98%   Weight: 68.9 kg (152 lb)   Height: 5' 8\" (1.727 m)            Physical Exam   Constitutional: He appears well-developed and well-nourished. No distress. HENT:   Head: Atraumatic. Mouth/Throat: Oropharynx is clear and moist.   Eyes: No scleral icterus. Neck: Neck supple. Cardiovascular: Normal rate and intact distal pulses. Pulmonary/Chest: Effort normal. No respiratory distress. He has no wheezes. Abdominal: Soft.  There is no tenderness. There is no rebound. Musculoskeletal: Normal range of motion. He exhibits no edema or tenderness. Lymphadenopathy:     He has no cervical adenopathy. Neurological: He is alert. Skin: Skin is warm and dry. Psychiatric: His behavior is normal. Thought content normal.   Nursing note and vitals reviewed. MDM  Number of Diagnoses or Management Options  Acute upper respiratory infection:   Diagnosis management comments: With multiple days of cough some posterior drainage and some general myalgias left his work early due to this on this day. Not aware of any fever and no visualized purulent sputum. .No close contacts are sick to his awareness. Amount and/or Complexity of Data Reviewed  Clinical lab tests: reviewed  Tests in the radiology section of CPT®: reviewed    Risk of Complications, Morbidity, and/or Mortality  Presenting problems: moderate  Diagnostic procedures: low  Management options: moderate    Patient Progress  Patient progress: stable    ED Course       Procedures    Recent Results (from the past 12 hour(s))   INFLUENZA A & B AG (RAPID TEST)    Collection Time: 01/02/18  8:41 PM   Result Value Ref Range    Influenza A Ag NEGATIVE  NEG      Influenza B Ag NEGATIVE  NEG     STREP AG SCREEN, GROUP A    Collection Time: 01/02/18  8:41 PM   Result Value Ref Range    Group A Strep Ag ID NEGATIVE  NEG          Imaging Results           XR CHEST PORT (Final result) Result time: 01/02/18 22:25:51     Final result by Jeny Downs MD (01/02/18 22:25:51)     Impression:     Impression:    No pulmonary infiltrate identified.           Narrative:     Examination: Chest, portable AP view    History: Cough      Comparison: 8/9/2017    Findings:    The cardiomediastinal silhouette is within normal limits in size. There is no focal pulmonary infiltrate, sizable pleural effusion, or  pneumothorax.      Thoracic scoliosis noted.

## 2018-01-03 NOTE — DISCHARGE INSTRUCTIONS
Saline Nasal Washes: Care Instructions  Your Care Instructions  Saline nasal washes help keep the nasal passages open by washing out thick or dried mucus. This simple remedy can help relieve symptoms of allergies, sinusitis, and colds. It also can make the nose feel more comfortable by keeping the mucous membranes moist. You may notice a little burning sensation in your nose the first few times you use the solution, but this usually gets better in a few days. Follow-up care is a key part of your treatment and safety. Be sure to make and go to all appointments, and call your doctor if you are having problems. It's also a good idea to know your test results and keep a list of the medicines you take. How can you care for yourself at home? · You can buy premixed saline solution in a squeeze bottle or other sinus rinse products at a drugstore. Read and follow the instructions on the label. · You also can make your own saline solution by adding 1 teaspoon of salt and 1 teaspoon of baking soda to 2 cups of distilled water. · If you use a homemade solution, pour a small amount into a clean bowl. Using a rubber bulb syringe, squeeze the syringe and place the tip in the salt water. Pull a small amount of the salt water into the syringe by relaxing your hand. · Sit down with your head tilted slightly back. Do not lie down. Put the tip of the bulb syringe or the squeeze bottle a little way into one of your nostrils. Gently drip or squirt a few drops into the nostril. Repeat with the other nostril. Some sneezing and gagging are normal at first.  · Gently blow your nose. · Wipe the syringe or bottle tip clean after each use. · Repeat this 2 or 3 times a day. · Use nasal washes gently if you have nosebleeds often. When should you call for help? Watch closely for changes in your health, and be sure to contact your doctor if:  ? · You often get nosebleeds. ? · You have problems doing the nasal washes.    Where can you learn more? Go to http://royce-tyler.info/. Enter [id number] in the search box to learn more about \"Saline Nasal Washes: Care Instructions. \"  Current as of: May 12, 2017  Content Version: 11.4  © 4056-8437 [employer]. Care instructions adapted under license by [employer] (which disclaims liability or warranty for this information). If you have questions about a medical condition or this instruction, always ask your healthcare professional. [address] any warranty or liability for your use of this information. Upper Respiratory Infection (Cold): Care Instructions  Your Care Instructions    An upper respiratory infection, or URI, is an infection of the nose, sinuses, or throat. URIs are spread by coughs, sneezes, and direct contact. The common cold is the most frequent kind of URI. The flu and sinus infections are other kinds of URIs. Almost all URIs are caused by viruses. Antibiotics won't cure them. But you can treat most infections with home care. This may include drinking lots of fluids and taking over-the-counter pain medicine. You will probably feel better in 4 to 10 days. The doctor has checked you carefully, but problems can develop later. If you notice any problems or new symptoms, get medical treatment right away. Follow-up care is a key part of your treatment and safety. Be sure to make and go to all appointments, and call your doctor if you are having problems. It's also a good idea to know your test results and keep a list of the medicines you take. How can you care for yourself at home? · To prevent dehydration, drink plenty of fluids, enough so that your urine is light yellow or clear like water. Choose water and other caffeine-free clear liquids until you feel better. If you have kidney, heart, or liver disease and have to limit fluids, talk with your doctor before you increase the amount of fluids you drink.   · Take an over-the-counter pain medicine, such as acetaminophen (Tylenol), ibuprofen (Advil, Motrin), or naproxen (Aleve). Read and follow all instructions on the label. · Before you use cough and cold medicines, check the label. These medicines may not be safe for young children or for people with certain health problems. · Be careful when taking over-the-counter cold or flu medicines and Tylenol at the same time. Many of these medicines have acetaminophen, which is Tylenol. Read the labels to make sure that you are not taking more than the recommended dose. Too much acetaminophen (Tylenol) can be harmful. · Get plenty of rest.  · Do not smoke or allow others to smoke around you. If you need help quitting, talk to your doctor about stop-smoking programs and medicines. These can increase your chances of quitting for good. When should you call for help? Call 911 anytime you think you may need emergency care. For example, call if:  ? · You have severe trouble breathing. ?Call your doctor now or seek immediate medical care if:  ? · You seem to be getting much sicker. ? · You have new or worse trouble breathing. ? · You have a new or higher fever. ? · You have a new rash. ? Watch closely for changes in your health, and be sure to contact your doctor if:  ? · You have a new symptom, such as a sore throat, an earache, or sinus pain. ? · You cough more deeply or more often, especially if you notice more mucus or a change in the color of your mucus. ? · You do not get better as expected. Where can you learn more? Go to http://royce-tyler.info/. Enter Y888 in the search box to learn more about \"Upper Respiratory Infection (Cold): Care Instructions. \"  Current as of: May 12, 2017  Content Version: 11.4  © 5968-6982 [employer], [employer]. Care instructions adapted under license by [employer] (which disclaims liability or warranty for this information).  If you have questions about a medical condition or this instruction, always ask your healthcare professional. [place] [address] any warranty or liability for your use of this information.

## 2018-01-04 LAB
BACTERIA SPEC CULT: ABNORMAL
SERVICE CMNT-IMP: ABNORMAL

## 2019-10-25 NOTE — ED TRIAGE NOTES
PT arrived to ED c/o right sided chest pain that is worse when he takes deep breaths in. PT states he has felt weak. PT states the last time he felt like this he was diagnosed with pneumonia. negative

## 2020-03-10 ENCOUNTER — ANESTHESIA (OUTPATIENT)
Dept: SURGERY | Age: 25
DRG: 357 | End: 2020-03-10
Payer: COMMERCIAL

## 2020-03-10 ENCOUNTER — HOSPITAL ENCOUNTER (INPATIENT)
Age: 25
LOS: 1 days | Discharge: HOME | DRG: 357 | End: 2020-03-13
Attending: EMERGENCY MEDICINE | Admitting: SURGERY
Payer: COMMERCIAL

## 2020-03-10 ENCOUNTER — ANESTHESIA EVENT (OUTPATIENT)
Dept: SURGERY | Age: 25
DRG: 357 | End: 2020-03-10
Payer: COMMERCIAL

## 2020-03-10 DIAGNOSIS — K61.1 PERIRECTAL ABSCESS: Primary | ICD-10-CM

## 2020-03-10 PROBLEM — L02.31 CELLULITIS AND ABSCESS OF BUTTOCK: Status: ACTIVE | Noted: 2020-03-10

## 2020-03-10 PROBLEM — Z21 ASYMPTOMATIC HIV INFECTION (HCC): Status: ACTIVE | Noted: 2020-03-10

## 2020-03-10 PROBLEM — K61.39 ISCHIORECTAL ABSCESS: Status: ACTIVE | Noted: 2020-03-10

## 2020-03-10 PROBLEM — L03.317 CELLULITIS AND ABSCESS OF BUTTOCK: Status: ACTIVE | Noted: 2020-03-10

## 2020-03-10 LAB
ANION GAP SERPL CALC-SCNC: 7 MMOL/L (ref 7–16)
BUN SERPL-MCNC: 10 MG/DL (ref 6–23)
CALCIUM SERPL-MCNC: 8.9 MG/DL (ref 8.3–10.4)
CHLORIDE SERPL-SCNC: 99 MMOL/L (ref 98–107)
CO2 SERPL-SCNC: 29 MMOL/L (ref 21–32)
CREAT SERPL-MCNC: 0.85 MG/DL (ref 0.8–1.5)
ERYTHROCYTE [DISTWIDTH] IN BLOOD BY AUTOMATED COUNT: 13.5 % (ref 11.9–14.6)
GLUCOSE SERPL-MCNC: 84 MG/DL (ref 65–100)
HCT VFR BLD AUTO: 40.5 % (ref 41.1–50.3)
HGB BLD-MCNC: 13 G/DL (ref 13.6–17.2)
MCH RBC QN AUTO: 29 PG (ref 26.1–32.9)
MCHC RBC AUTO-ENTMCNC: 32.1 G/DL (ref 31.4–35)
MCV RBC AUTO: 90.4 FL (ref 79.6–97.8)
NRBC # BLD: 0 K/UL (ref 0–0.2)
PLATELET # BLD AUTO: 314 K/UL (ref 150–450)
PMV BLD AUTO: 11.4 FL (ref 9.4–12.3)
POTASSIUM SERPL-SCNC: 4.3 MMOL/L (ref 3.5–5.1)
RBC # BLD AUTO: 4.48 M/UL (ref 4.23–5.6)
SODIUM SERPL-SCNC: 135 MMOL/L (ref 136–145)
WBC # BLD AUTO: 15.9 K/UL (ref 4.3–11.1)

## 2020-03-10 PROCEDURE — 0J9B0ZZ DRAINAGE OF PERINEUM SUBCUTANEOUS TISSUE AND FASCIA, OPEN APPROACH: ICD-10-PCS | Performed by: SURGERY

## 2020-03-10 PROCEDURE — 77030010509 HC AIRWY LMA MSK TELE -A: Performed by: ANESTHESIOLOGY

## 2020-03-10 PROCEDURE — 99284 EMERGENCY DEPT VISIT MOD MDM: CPT

## 2020-03-10 PROCEDURE — 76210000006 HC OR PH I REC 0.5 TO 1 HR: Performed by: SURGERY

## 2020-03-10 PROCEDURE — 74011250636 HC RX REV CODE- 250/636: Performed by: NURSE PRACTITIONER

## 2020-03-10 PROCEDURE — 74011250636 HC RX REV CODE- 250/636: Performed by: EMERGENCY MEDICINE

## 2020-03-10 PROCEDURE — 87077 CULTURE AEROBIC IDENTIFY: CPT

## 2020-03-10 PROCEDURE — 87075 CULTR BACTERIA EXCEPT BLOOD: CPT

## 2020-03-10 PROCEDURE — 74011000250 HC RX REV CODE- 250: Performed by: NURSE ANESTHETIST, CERTIFIED REGISTERED

## 2020-03-10 PROCEDURE — 96375 TX/PRO/DX INJ NEW DRUG ADDON: CPT

## 2020-03-10 PROCEDURE — 74011250637 HC RX REV CODE- 250/637: Performed by: SURGERY

## 2020-03-10 PROCEDURE — 77030019908 HC STETH ESOPH SIMS -A: Performed by: ANESTHESIOLOGY

## 2020-03-10 PROCEDURE — 74011250636 HC RX REV CODE- 250/636: Performed by: ANESTHESIOLOGY

## 2020-03-10 PROCEDURE — 0JBB0ZZ EXCISION OF PERINEUM SUBCUTANEOUS TISSUE AND FASCIA, OPEN APPROACH: ICD-10-PCS | Performed by: SURGERY

## 2020-03-10 PROCEDURE — 74011250636 HC RX REV CODE- 250/636: Performed by: NURSE ANESTHETIST, CERTIFIED REGISTERED

## 2020-03-10 PROCEDURE — 99218 HC RM OBSERVATION: CPT

## 2020-03-10 PROCEDURE — 96374 THER/PROPH/DIAG INJ IV PUSH: CPT

## 2020-03-10 PROCEDURE — 87186 SC STD MICRODIL/AGAR DIL: CPT

## 2020-03-10 PROCEDURE — 87076 CULTURE ANAEROBE IDENT EACH: CPT

## 2020-03-10 PROCEDURE — 87153 DNA/RNA SEQUENCING: CPT

## 2020-03-10 PROCEDURE — 85027 COMPLETE CBC AUTOMATED: CPT

## 2020-03-10 PROCEDURE — 80048 BASIC METABOLIC PNL TOTAL CA: CPT

## 2020-03-10 PROCEDURE — 77030002888 HC SUT CHRMC J&J -A: Performed by: SURGERY

## 2020-03-10 PROCEDURE — 87205 SMEAR GRAM STAIN: CPT

## 2020-03-10 PROCEDURE — 76060000032 HC ANESTHESIA 0.5 TO 1 HR: Performed by: SURGERY

## 2020-03-10 PROCEDURE — 94760 N-INVAS EAR/PLS OXIMETRY 1: CPT

## 2020-03-10 PROCEDURE — 77010033678 HC OXYGEN DAILY

## 2020-03-10 PROCEDURE — 77030027138 HC INCENT SPIROMETER -A

## 2020-03-10 PROCEDURE — 76010000138 HC OR TIME 0.5 TO 1 HR: Performed by: SURGERY

## 2020-03-10 PROCEDURE — 77030040361 HC SLV COMPR DVT MDII -B: Performed by: SURGERY

## 2020-03-10 PROCEDURE — 74011250636 HC RX REV CODE- 250/636: Performed by: SURGERY

## 2020-03-10 RX ORDER — SODIUM CHLORIDE, SODIUM LACTATE, POTASSIUM CHLORIDE, CALCIUM CHLORIDE 600; 310; 30; 20 MG/100ML; MG/100ML; MG/100ML; MG/100ML
125 INJECTION, SOLUTION INTRAVENOUS CONTINUOUS
Status: DISCONTINUED | OUTPATIENT
Start: 2020-03-10 | End: 2020-03-10

## 2020-03-10 RX ORDER — HYDROMORPHONE HYDROCHLORIDE 2 MG/ML
0.5 INJECTION, SOLUTION INTRAMUSCULAR; INTRAVENOUS; SUBCUTANEOUS
Status: DISCONTINUED | OUTPATIENT
Start: 2020-03-10 | End: 2020-03-10 | Stop reason: HOSPADM

## 2020-03-10 RX ORDER — METRONIDAZOLE 500 MG/100ML
500 INJECTION, SOLUTION INTRAVENOUS EVERY 12 HOURS
Status: DISCONTINUED | OUTPATIENT
Start: 2020-03-11 | End: 2020-03-11 | Stop reason: DRUGHIGH

## 2020-03-10 RX ORDER — ONDANSETRON 2 MG/ML
4 INJECTION INTRAMUSCULAR; INTRAVENOUS
Status: DISCONTINUED | OUTPATIENT
Start: 2020-03-10 | End: 2020-03-13 | Stop reason: HOSPADM

## 2020-03-10 RX ORDER — SODIUM CHLORIDE, SODIUM LACTATE, POTASSIUM CHLORIDE, CALCIUM CHLORIDE 600; 310; 30; 20 MG/100ML; MG/100ML; MG/100ML; MG/100ML
75 INJECTION, SOLUTION INTRAVENOUS CONTINUOUS
Status: DISCONTINUED | OUTPATIENT
Start: 2020-03-10 | End: 2020-03-10 | Stop reason: HOSPADM

## 2020-03-10 RX ORDER — SODIUM CHLORIDE 9 MG/ML
1000 INJECTION, SOLUTION INTRAVENOUS ONCE
Status: COMPLETED | OUTPATIENT
Start: 2020-03-10 | End: 2020-03-10

## 2020-03-10 RX ORDER — LIDOCAINE HYDROCHLORIDE 20 MG/ML
INJECTION, SOLUTION EPIDURAL; INFILTRATION; INTRACAUDAL; PERINEURAL AS NEEDED
Status: DISCONTINUED | OUTPATIENT
Start: 2020-03-10 | End: 2020-03-10 | Stop reason: HOSPADM

## 2020-03-10 RX ORDER — FENTANYL CITRATE 50 UG/ML
100 INJECTION, SOLUTION INTRAMUSCULAR; INTRAVENOUS ONCE
Status: DISCONTINUED | OUTPATIENT
Start: 2020-03-10 | End: 2020-03-10 | Stop reason: HOSPADM

## 2020-03-10 RX ORDER — HYDROCODONE BITARTRATE AND ACETAMINOPHEN 5; 325 MG/1; MG/1
2 TABLET ORAL AS NEEDED
Status: DISCONTINUED | OUTPATIENT
Start: 2020-03-10 | End: 2020-03-10 | Stop reason: HOSPADM

## 2020-03-10 RX ORDER — SODIUM CHLORIDE, SODIUM LACTATE, POTASSIUM CHLORIDE, CALCIUM CHLORIDE 600; 310; 30; 20 MG/100ML; MG/100ML; MG/100ML; MG/100ML
75 INJECTION, SOLUTION INTRAVENOUS CONTINUOUS
Status: DISCONTINUED | OUTPATIENT
Start: 2020-03-10 | End: 2020-03-12

## 2020-03-10 RX ORDER — MIDAZOLAM HYDROCHLORIDE 1 MG/ML
5 INJECTION, SOLUTION INTRAMUSCULAR; INTRAVENOUS ONCE
Status: DISCONTINUED | OUTPATIENT
Start: 2020-03-10 | End: 2020-03-10 | Stop reason: HOSPADM

## 2020-03-10 RX ORDER — LIDOCAINE HYDROCHLORIDE 10 MG/ML
0.1 INJECTION INFILTRATION; PERINEURAL AS NEEDED
Status: DISCONTINUED | OUTPATIENT
Start: 2020-03-10 | End: 2020-03-10 | Stop reason: HOSPADM

## 2020-03-10 RX ORDER — PROPOFOL 10 MG/ML
INJECTION, EMULSION INTRAVENOUS AS NEEDED
Status: DISCONTINUED | OUTPATIENT
Start: 2020-03-10 | End: 2020-03-10 | Stop reason: HOSPADM

## 2020-03-10 RX ORDER — KETOROLAC TROMETHAMINE 30 MG/ML
INJECTION, SOLUTION INTRAMUSCULAR; INTRAVENOUS AS NEEDED
Status: DISCONTINUED | OUTPATIENT
Start: 2020-03-10 | End: 2020-03-10 | Stop reason: HOSPADM

## 2020-03-10 RX ORDER — MIDAZOLAM HYDROCHLORIDE 1 MG/ML
2 INJECTION, SOLUTION INTRAMUSCULAR; INTRAVENOUS
Status: COMPLETED | OUTPATIENT
Start: 2020-03-10 | End: 2020-03-10

## 2020-03-10 RX ORDER — FENTANYL CITRATE 50 UG/ML
50 INJECTION, SOLUTION INTRAMUSCULAR; INTRAVENOUS ONCE
Status: COMPLETED | OUTPATIENT
Start: 2020-03-10 | End: 2020-03-10

## 2020-03-10 RX ORDER — OXYCODONE HYDROCHLORIDE 5 MG/1
5 TABLET ORAL
Status: DISCONTINUED | OUTPATIENT
Start: 2020-03-10 | End: 2020-03-10 | Stop reason: HOSPADM

## 2020-03-10 RX ORDER — SODIUM CHLORIDE 0.9 % (FLUSH) 0.9 %
5-40 SYRINGE (ML) INJECTION EVERY 8 HOURS
Status: DISCONTINUED | OUTPATIENT
Start: 2020-03-10 | End: 2020-03-10

## 2020-03-10 RX ORDER — METRONIDAZOLE 500 MG/100ML
500 INJECTION, SOLUTION INTRAVENOUS
Status: COMPLETED | OUTPATIENT
Start: 2020-03-10 | End: 2020-03-10

## 2020-03-10 RX ORDER — SODIUM CHLORIDE, SODIUM LACTATE, POTASSIUM CHLORIDE, CALCIUM CHLORIDE 600; 310; 30; 20 MG/100ML; MG/100ML; MG/100ML; MG/100ML
INJECTION, SOLUTION INTRAVENOUS
Status: DISCONTINUED | OUTPATIENT
Start: 2020-03-10 | End: 2020-03-10 | Stop reason: HOSPADM

## 2020-03-10 RX ORDER — ONDANSETRON 2 MG/ML
INJECTION INTRAMUSCULAR; INTRAVENOUS AS NEEDED
Status: DISCONTINUED | OUTPATIENT
Start: 2020-03-10 | End: 2020-03-10 | Stop reason: HOSPADM

## 2020-03-10 RX ORDER — HYDROMORPHONE HYDROCHLORIDE 1 MG/ML
1 INJECTION, SOLUTION INTRAMUSCULAR; INTRAVENOUS; SUBCUTANEOUS
Status: COMPLETED | OUTPATIENT
Start: 2020-03-10 | End: 2020-03-10

## 2020-03-10 RX ORDER — SODIUM CHLORIDE 0.9 % (FLUSH) 0.9 %
5-40 SYRINGE (ML) INJECTION AS NEEDED
Status: DISCONTINUED | OUTPATIENT
Start: 2020-03-10 | End: 2020-03-10

## 2020-03-10 RX ORDER — DEXAMETHASONE SODIUM PHOSPHATE 4 MG/ML
INJECTION, SOLUTION INTRA-ARTICULAR; INTRALESIONAL; INTRAMUSCULAR; INTRAVENOUS; SOFT TISSUE AS NEEDED
Status: DISCONTINUED | OUTPATIENT
Start: 2020-03-10 | End: 2020-03-10 | Stop reason: HOSPADM

## 2020-03-10 RX ORDER — FENTANYL CITRATE 50 UG/ML
INJECTION, SOLUTION INTRAMUSCULAR; INTRAVENOUS AS NEEDED
Status: DISCONTINUED | OUTPATIENT
Start: 2020-03-10 | End: 2020-03-10 | Stop reason: HOSPADM

## 2020-03-10 RX ORDER — HYDROCODONE BITARTRATE AND ACETAMINOPHEN 5; 325 MG/1; MG/1
1-2 TABLET ORAL
Status: DISCONTINUED | OUTPATIENT
Start: 2020-03-10 | End: 2020-03-13 | Stop reason: HOSPADM

## 2020-03-10 RX ORDER — PSEUDOEPHEDRINE HYDROCHLORIDE 60 MG/1
60 TABLET ORAL
Status: DISCONTINUED | OUTPATIENT
Start: 2020-03-10 | End: 2020-03-13 | Stop reason: HOSPADM

## 2020-03-10 RX ORDER — DIPHENHYDRAMINE HCL 25 MG
25-50 CAPSULE ORAL
Status: DISCONTINUED | OUTPATIENT
Start: 2020-03-10 | End: 2020-03-13 | Stop reason: HOSPADM

## 2020-03-10 RX ORDER — LORATADINE 10 MG/1
10 TABLET ORAL DAILY
Status: DISCONTINUED | OUTPATIENT
Start: 2020-03-11 | End: 2020-03-13 | Stop reason: HOSPADM

## 2020-03-10 RX ORDER — ONDANSETRON 2 MG/ML
4 INJECTION INTRAMUSCULAR; INTRAVENOUS
Status: COMPLETED | OUTPATIENT
Start: 2020-03-10 | End: 2020-03-10

## 2020-03-10 RX ADMIN — SODIUM CHLORIDE 1000 ML: 900 INJECTION, SOLUTION INTRAVENOUS at 09:45

## 2020-03-10 RX ADMIN — ONDANSETRON 4 MG: 2 INJECTION INTRAMUSCULAR; INTRAVENOUS at 09:41

## 2020-03-10 RX ADMIN — ONDANSETRON 4 MG: 2 INJECTION INTRAMUSCULAR; INTRAVENOUS at 16:04

## 2020-03-10 RX ADMIN — DEXAMETHASONE SODIUM PHOSPHATE 4 MG: 4 INJECTION, SOLUTION INTRAMUSCULAR; INTRAVENOUS at 16:04

## 2020-03-10 RX ADMIN — METRONIDAZOLE 500 MG: 500 INJECTION, SOLUTION INTRAVENOUS at 15:27

## 2020-03-10 RX ADMIN — FENTANYL CITRATE 50 MCG: 50 INJECTION, SOLUTION INTRAMUSCULAR; INTRAVENOUS at 15:22

## 2020-03-10 RX ADMIN — MIDAZOLAM HYDROCHLORIDE 2 MG: 2 INJECTION, SOLUTION INTRAMUSCULAR; INTRAVENOUS at 15:30

## 2020-03-10 RX ADMIN — HYDROMORPHONE HYDROCHLORIDE 1 MG: 1 INJECTION, SOLUTION INTRAMUSCULAR; INTRAVENOUS; SUBCUTANEOUS at 09:41

## 2020-03-10 RX ADMIN — PROPOFOL 200 MG: 10 INJECTION, EMULSION INTRAVENOUS at 15:35

## 2020-03-10 RX ADMIN — HYDROCODONE BITARTRATE AND ACETAMINOPHEN 1 TABLET: 5; 325 TABLET ORAL at 18:57

## 2020-03-10 RX ADMIN — LIDOCAINE HYDROCHLORIDE 100 MG: 20 INJECTION, SOLUTION EPIDURAL; INFILTRATION; INTRACAUDAL; PERINEURAL at 15:35

## 2020-03-10 RX ADMIN — HYDROCODONE BITARTRATE AND ACETAMINOPHEN 2 TABLET: 5; 325 TABLET ORAL at 23:59

## 2020-03-10 RX ADMIN — KETOROLAC TROMETHAMINE 30 MG: 30 INJECTION, SOLUTION INTRAMUSCULAR; INTRAVENOUS at 16:05

## 2020-03-10 RX ADMIN — FENTANYL CITRATE 50 MCG: 50 INJECTION INTRAMUSCULAR; INTRAVENOUS at 16:13

## 2020-03-10 RX ADMIN — FENTANYL CITRATE 50 MCG: 50 INJECTION INTRAMUSCULAR; INTRAVENOUS at 15:31

## 2020-03-10 RX ADMIN — SODIUM CHLORIDE, SODIUM LACTATE, POTASSIUM CHLORIDE, AND CALCIUM CHLORIDE 125 ML/HR: 600; 310; 30; 20 INJECTION, SOLUTION INTRAVENOUS at 11:20

## 2020-03-10 RX ADMIN — SODIUM CHLORIDE, SODIUM LACTATE, POTASSIUM CHLORIDE, AND CALCIUM CHLORIDE: 600; 310; 30; 20 INJECTION, SOLUTION INTRAVENOUS at 15:27

## 2020-03-10 RX ADMIN — SODIUM CHLORIDE, SODIUM LACTATE, POTASSIUM CHLORIDE, AND CALCIUM CHLORIDE 75 ML/HR: 600; 310; 30; 20 INJECTION, SOLUTION INTRAVENOUS at 18:32

## 2020-03-10 NOTE — ANESTHESIA PREPROCEDURE EVALUATION
Relevant Problems   No relevant active problems       Anesthetic History   No history of anesthetic complications            Review of Systems / Medical History  Patient summary reviewed and pertinent labs reviewed    Pulmonary  Within defined limits                 Neuro/Psych   Within defined limits           Cardiovascular  Within defined limits                Exercise tolerance: >4 METS     GI/Hepatic/Renal  Within defined limits              Endo/Other  Within defined limits           Other Findings   Comments: HIV +           Physical Exam    Airway  Mallampati: II  TM Distance: 4 - 6 cm  Neck ROM: normal range of motion   Mouth opening: Normal     Cardiovascular  Regular rate and rhythm,  S1 and S2 normal,  no murmur, click, rub, or gallop             Dental         Pulmonary  Breath sounds clear to auscultation               Abdominal         Other Findings            Anesthetic Plan    ASA: 2, emergent  Anesthesia type: general          Induction: Intravenous  Anesthetic plan and risks discussed with: Patient

## 2020-03-10 NOTE — ANESTHESIA POSTPROCEDURE EVALUATION
Procedure(s):  PERIRECTAL ABSCESS EXCISION. general    Anesthesia Post Evaluation      Multimodal analgesia: multimodal analgesia used between 6 hours prior to anesthesia start to PACU discharge  Patient location during evaluation: bedside  Patient participation: complete - patient participated  Level of consciousness: awake and alert  Pain score: 3  Pain management: adequate  Airway patency: patent  Anesthetic complications: no  Cardiovascular status: acceptable and hemodynamically stable  Respiratory status: acceptable  Hydration status: acceptable  Post anesthesia nausea and vomiting:  none      Vitals Value Taken Time   BP 91/54 3/10/2020  5:40 PM   Temp 36.8 °C (98.2 °F) 3/10/2020  5:14 PM   Pulse 68 3/10/2020  5:41 PM   Resp 16 3/10/2020  5:25 PM   SpO2 95 % 3/10/2020  5:41 PM   Vitals shown include unvalidated device data.

## 2020-03-10 NOTE — H&P
[address]  Faisal Blum, [address] [facility]  [phone]    H&P/Consult Note/Progress Note:   Anam Macedo  MRN: [id number]  :1995  Age:24 y.o.    HPI: Anam Macedo is a 22 y.o. male who we are asked by Dr. Felicia Oliver to see for a perirectal abscess. The patient has a PMHx of HIV and reports CD4 count is \"good\". He states he began to have severe perirectal pain about 2-3 days ago that gradually worsened. He denies prior episodes. He denies drainage from the area, fever, or chills. The area is palpable on exam. AF, VSS. No recent labs, no imaging. Past Medical History:   Diagnosis Date    Asymptomatic HIV infection ([facility] [address] 3/10/2020     No past surgical history on file. Current Facility-Administered Medications   Medication Dose Route Frequency    sodium chloride (NS) flush 5-40 mL  5-40 mL IntraVENous Q8H    sodium chloride (NS) flush 5-40 mL  5-40 mL IntraVENous PRN    cefTRIAXone (ROCEPHIN) 1 g in 0.9% sodium chloride (MBP/ADV) 50 mL  1 g IntraVENous ON CALL TO OR    metroNIDAZOLE (FLAGYL) IVPB premix 500 mg  500 mg IntraVENous ON CALL TO OR    lactated Ringers infusion  125 mL/hr IntraVENous CONTINUOUS     Current Outpatient Medications   Medication Sig    elvitegravir-cobicistat-emtricitabine-tenofovir alafenamide (GENVOYA) tab tablet Take 1 Tab by mouth daily (with breakfast).  traMADol (ULTRAM) 50 mg tablet Take 1 Tab by mouth every six (6) hours as needed for Pain. Max Daily Amount: 200 mg.    HYDROcodone-homatropine (HYCODAN) 5-1.5 mg/5 mL (5 mL) syrup Take 5 mL by mouth four (4) times daily. Max Daily Amount: 20 mL.  loratadine-pseudoephedrine (CLARITIN-D 24 HOUR)  mg per tablet Take 1 Tab by mouth daily. Patient has no known allergies.   Social History     Socioeconomic History    Marital status: SINGLE     Spouse name: Not on file    Number of children: Not on file    Years of education: Not on file    Highest education level: Not on file   Tobacco Use    Smoking status: Current Every Day Smoker     Packs/day: 0.25    Smokeless tobacco: Never Used   Substance and Sexual Activity    Alcohol use: No    Drug use: Yes     Types: Marijuana    Sexual activity: Never     Social History     Tobacco Use   Smoking Status Current Every Day Smoker    Packs/day: 0.25   Smokeless Tobacco Never Used     No family history on file. ROS: The patient has no difficulty with chest pain or shortness of breath. No fever or chills. Comprehensive review of systems was otherwise unremarkable except as noted above. Physical Exam:   Visit Vitals  /63   Pulse 80   Temp 98.9 °F (37.2 °C)   Resp 16   Ht 5' 8\" (1.727 m)   Wt 155 lb (70.3 kg)   SpO2 95%   BMI 23.57 kg/m²     Constitutional: Alert, oriented, cooperative patient in no acute distress; appears stated age    Eyes:Sclera are clear. EOMs intact  ENMT: no external lesions gross hearing normal; no obvious neck masses, no ear or lip lesions, nares normal  CV: RRR. Normal perfusion  Resp: No JVD. Breathing is  non-labored; no audible wheezing. GI: soft and non-distended     Musculoskeletal: unremarkable with normal function. No embolic signs or cyanosis. : approx 5x6 right sided ischiorectal abscess with surrounding induration and some fluctuance. +tenderness.  Internal exam deferred  Neuro:  Oriented; moves all 4; no focal deficits  Psychiatric: normal affect and mood, no memory impairment    Recent vitals (if inpt):  Patient Vitals for the past 24 hrs:   BP Temp Pulse Resp SpO2 Height Weight   03/10/20 1021 118/63  80  95 %     03/10/20 0949 107/68  88 16 98 %     03/10/20 0835 121/72 98.9 °F (37.2 °C) (!) 131 18 97 % 5' 8\" (1.727 m) 155 lb (70.3 kg)       Labs:  Recent Labs     03/10/20  0934   WBC 15.9*   HGB 13.0*      *   K 4.3   CL 99   CO2 29   BUN 10   CREA 0.85   GLU 84       Lab Results   Component Value Date/Time    WBC 15.9 (H) 03/10/2020 09:34 AM    HGB 13.0 (L) 03/10/2020 09:34 AM    PLATELET 893 16/02/5539 09:34 AM    Sodium 135 (L) 03/10/2020 09:34 AM    Potassium 4.3 03/10/2020 09:34 AM    Chloride 99 03/10/2020 09:34 AM    CO2 29 03/10/2020 09:34 AM    BUN 10 03/10/2020 09:34 AM    Creatinine 0.85 03/10/2020 09:34 AM    Glucose 84 03/10/2020 09:34 AM    Bilirubin, total 0.3 08/09/2017 08:20 PM    AST (SGOT) 32 08/09/2017 08:20 PM    ALT (SGPT) 18 08/09/2017 08:20 PM    Alk. phosphatase 72 08/09/2017 08:20 PM    Lipase 134 08/02/2017 11:37 PM    Troponin-I, Qt. <0.02 (L) 08/09/2017 08:20 PM       I reviewed recent labs and recent radiologic studies. I independently reviewed radiology images for studies I described above or studies I have ordered. Admission date (for inpatients): 3/10/2020   * No surgery date entered *  Procedure(s):  PERIRECTAL ABSCESS EXCISION    ASSESSMENT/PLAN:  Problem List  Date Reviewed: 3/10/2020          Codes Class Noted    * (Principal) Ischiorectal abscess ICD-10-CM: K61.39  ICD-9-CM: 147  3/10/2020        Asymptomatic HIV infection ([facility] [address] ICD-10-CM: Z21  ICD-9-CM: V08  3/10/2020            Principal Problem:    Ischiorectal abscess (3/10/2020)    Active Problems:    Asymptomatic HIV infection ([facility] [address] (3/10/2020)       Admit for surgery  NPO  IVF  ABX on call to OR  Obtain consent  SCD for prophylaxis  To OR for ischiorectal abscess incision and drainage with Dr. Domonique Garcia lithotomy positioning    Discussed the patient's condition and treatment options with the patient. Discussed risks of surgery in language the patient could understand. The patient voiced understanding of all this and all questions were answered. Alternatives to surgery were discussed also and risks of the alternatives. The patient requested that we proceed with surgery. Informed consent was obtained. I have personally performed a face-to-face diagnostic evaluation and management  service on this patient.     I have independently seen the patient. I have independently obtained the above history from the patient/family. I have independently examined the patient with above findings. I have independently reviewed data/labs for this patient and developed the above plan of care (MDM).   Signed: Monse Zamudio MD, FACS

## 2020-03-10 NOTE — PERIOP NOTES
TRANSFER - IN REPORT:    Verbal report received from rehan RN(name) on Keeley Gallardo  being received from ER(unit) for routine progression of care      Report consisted of patients Situation, Background, Assessment and   Recommendations(SBAR). Information from the following report(s) SBAR was reviewed with the receiving nurse. Opportunity for questions and clarification was provided. Assessment completed upon patients arrival to unit and care assumed.

## 2020-03-10 NOTE — PROGRESS NOTES
TRANSFER - IN REPORT:    Verbal report received from Hussein Corrales RN on Yael Carrasquillo  being received from PACU for routine post - op      Report consisted of patients Situation, Background, Assessment and   Recommendations(SBAR). Information from the following report(s) SBAR, Kardex, OR Summary and MAR was reviewed with the receiving nurse. Opportunity for questions and clarification was provided. Assessment completed upon patients arrival to unit and care assumed.

## 2020-03-10 NOTE — ED TRIAGE NOTES
Pt complains of redness swelling to his right upper leg. Denies drainage. States pain started 3 days ago.

## 2020-03-10 NOTE — PROGRESS NOTES
03/10/20 1815   Dual Skin Pressure Injury Assessment   Dual Skin Pressure Injury Assessment WDL   Second Care Provider (Based on [address] [place] [address]) Julien Moura RN   Skin Integumentary   Skin Integumentary (WDL) X   Skin Color Appropriate for ethnicity   Skin Condition/Temp Dry; Warm   Skin Integrity Incision (comment)  (Perirectal area)   Turgor Non-tenting   Hair Growth Present   Varicosities Absent   Wound Prevention and Protection Methods   Orientation of Wound Prevention Posterior   Location of Wound Prevention Sacrum/Coccyx   Dressing Present  No   Wound Offloading (Prevention Methods) Bed, pressure reduction mattress

## 2020-03-10 NOTE — PERIOP NOTES
TRANSFER - OUT REPORT:    Verbal report given to Mauro Oakes RN(name) on [employer]  being transferred to Main PReop(unit) for routine progression of care       Report consisted of patients Situation, Background, Assessment and   Recommendations(SBAR). Information from the following report(s) SBAR was reviewed with the receiving nurse. Lines:   Peripheral IV 03/10/20 Left Antecubital (Active)   Site Assessment Clean, dry, & intact 3/10/2020  9:41 AM        Opportunity for questions and clarification was provided.       Patient transported with:   Monitor

## 2020-03-10 NOTE — PERIOP NOTES
TRANSFER - OUT REPORT:    Verbal report given to I Just Shared on Ramanrp  being transferred to [facility] for routine post - op       Report consisted of patients Situation, Background, Assessment and   Recommendations(SBAR). Information from the following report(s) SBAR, OR Summary, Procedure Summary, Intake/Output and MAR was reviewed with the receiving nurse. Lines:   Peripheral IV 03/10/20 Left Antecubital (Active)   Site Assessment Clean, dry, & intact 3/10/2020  5:14 PM   Phlebitis Assessment 0 3/10/2020  5:14 PM   Infiltration Assessment 0 3/10/2020  5:14 PM   Dressing Status Clean, dry, & intact; Occlusive 3/10/2020  5:14 PM   Dressing Type Transparent;Tape 3/10/2020  5:14 PM   Hub Color/Line Status Pink;Patent 3/10/2020  5:14 PM   Alcohol Cap Used No 3/10/2020  5:14 PM        Opportunity for questions and clarification was provided. Patient transported with:   O2 @ 3 liters    VTE prophylaxis orders have been written for Lewis. Patient and family given floor number and nurses name. Family updated re: pt status after security code verified.

## 2020-03-10 NOTE — BRIEF OP NOTE
BRIEF OPERATIVE NOTE    Date of Procedure: 3/10/2020   Preoperative Diagnosis: R ISCHIORECTAL ABSCESS  Postoperative Diagnosis: R ISCHIORECTAL ABSCESS    Procedure(s):  I&D OF R ISCHIORECTAL ABSCESS   Surgeon(s) and Role:     * Sanjana Huber MD - Primary         Surgical Assistant: none    Surgical Staff:  Circ-1: Adelita Carlson RN  Scrub Tech-1: Dayton MCKENNA  Event Time In Time Out   Incision Start 1552    Incision Close 1609      Anesthesia: General   Estimated Blood Loss: <20 cc  Specimens:   ID Type Source Tests Collected by Time Destination   1 : Perirectal abscess Body Fluid Perirectal ANAEROBIC CULTURE, AEROBIC CULTURE + GRAM STAIN Gillermina Barthel, Marry Rubin, MD 3/10/2020 1603 Microbiology      Findings: Extensive induration R ischiorectal are with extension towards scrotum. Thinning skin ~ 4 cm from anus radially. Normal CHRIS. No fistula tract identified. No pus in rectum. I&D performed R lateral area with 3 cm window. Large amount of pus. Culture sent. Much indurated tissue surrounding drained abscess cavity (~egg size). Irrigated and packed with 2\" iodoform guaze.    Complications: none apparent  Implants: * No implants in log *      Berry Enrique MD

## 2020-03-10 NOTE — ED PROVIDER NOTES
The history is provided by the patient. Anal Pain    Episode onset: 2 to 3 days ago. The onset was gradual. The problem occurs continuously. The problem has been gradually worsening. The pain is severe. Associated symptoms include rectal pain. Pertinent negatives include no fever, no abdominal pain, no nausea and no vomiting. No past medical history on file. No past surgical history on file. No family history on file. Social History     Socioeconomic History    Marital status: SINGLE     Spouse name: Not on file    Number of children: Not on file    Years of education: Not on file    Highest education level: Not on file   Occupational History    Not on file   Social Needs    Financial resource strain: Not on file    Food insecurity:     Worry: Not on file     Inability: Not on file    Transportation needs:     Medical: Not on file     Non-medical: Not on file   Tobacco Use    Smoking status: Current Every Day Smoker     Packs/day: 0.25    Smokeless tobacco: Never Used   Substance and Sexual Activity    Alcohol use: No    Drug use: Yes     Types: Marijuana    Sexual activity: Never   Lifestyle    Physical activity:     Days per week: Not on file     Minutes per session: Not on file    Stress: Not on file   Relationships    Social connections:     Talks on phone: Not on file     Gets together: Not on file     Attends Confucianist service: Not on file     Active member of club or organization: Not on file     Attends meetings of clubs or organizations: Not on file     Relationship status: Not on file    Intimate partner violence:     Fear of current or ex partner: Not on file     Emotionally abused: Not on file     Physically abused: Not on file     Forced sexual activity: Not on file   Other Topics Concern    Not on file   Social History Narrative    Not on file         ALLERGIES: Patient has no known allergies. Review of Systems   Constitutional: Negative for chills and fever. Gastrointestinal: Positive for rectal pain. Negative for abdominal pain, anal bleeding, nausea and vomiting. Vitals:    03/10/20 0835   BP: 121/72   Pulse: (!) 131   Resp: 18   Temp: 98.9 °F (37.2 °C)   SpO2: 97%   Weight: 70.3 kg (155 lb)   Height: 5' 8\" (1.727 m)            Physical Exam  Constitutional:       General: He is in acute distress. Appearance: Normal appearance. Comments: Lying on stomach on the stretcher   Cardiovascular:      Rate and Rhythm: Tachycardia present. Pulses: Normal pulses. Heart sounds: Normal heart sounds. Pulmonary:      Effort: Pulmonary effort is normal.      Breath sounds: Normal breath sounds. Genitourinary:     Comments: 5 x 6 right-sided perirectal abscess adjacent to the rectum. Surrounding induration does extend towards the perineum and towards the gluteal area. There is a tender fluctuant area immediately adjacent to the rectum. Internal exam reveals some internal tenderness but no obvious fluctuance on that rectal wall. Skin:     General: Skin is warm and dry. Neurological:      Mental Status: He is alert. MDM  Number of Diagnoses or Management Options  Diagnosis management comments: Large perirectal abscess. Patient's HIV positive but on medications and reports his recent CD4 count and viral load were \"good\" patient is tachycardic but I believe this is mostly due to pain. IV will be placed labs will be drawn and IV pain medication provided. Surgery consulted for incision and drainage most likely in the operating room.        Amount and/or Complexity of Data Reviewed  Clinical lab tests: ordered and reviewed  Discuss the patient with other providers: yes           Procedures

## 2020-03-10 NOTE — PERIOP NOTES
Patient received in pre-op via transport for continuity of care. Ordered procedure with Dr. Seble Reinoso will be performed in main pre-op.

## 2020-03-10 NOTE — ED NOTES
TRANSFER - OUT REPORT:    Verbal report given to Halina Godinez RN(name) on Paramjit Pina  being transferred to [facility] Pre-Op(unit) for ordered procedure       Report consisted of patients Situation, Background, Assessment and   Recommendations(SBAR). Information from the following report(s) SBAR, ED Summary and MAR was reviewed with the receiving nurse. Lines:   Peripheral IV 03/10/20 Left Antecubital (Active)   Site Assessment Clean, dry, & intact 3/10/2020  9:41 AM        Opportunity for questions and clarification was provided.       Patient transported with:   [employer]

## 2020-03-11 LAB
ERYTHROCYTE [DISTWIDTH] IN BLOOD BY AUTOMATED COUNT: 13.1 % (ref 11.9–14.6)
HCT VFR BLD AUTO: 39.7 % (ref 41.1–50.3)
HGB BLD-MCNC: 13.1 G/DL (ref 13.6–17.2)
MCH RBC QN AUTO: 29.5 PG (ref 26.1–32.9)
MCHC RBC AUTO-ENTMCNC: 33 G/DL (ref 31.4–35)
MCV RBC AUTO: 89.4 FL (ref 79.6–97.8)
NRBC # BLD: 0 K/UL (ref 0–0.2)
PLATELET # BLD AUTO: 342 K/UL (ref 150–450)
PMV BLD AUTO: 10.5 FL (ref 9.4–12.3)
RBC # BLD AUTO: 4.44 M/UL (ref 4.23–5.6)
WBC # BLD AUTO: 20.3 K/UL (ref 4.3–11.1)

## 2020-03-11 PROCEDURE — 85027 COMPLETE CBC AUTOMATED: CPT

## 2020-03-11 PROCEDURE — 74011000258 HC RX REV CODE- 258: Performed by: SURGERY

## 2020-03-11 PROCEDURE — 36415 COLL VENOUS BLD VENIPUNCTURE: CPT

## 2020-03-11 PROCEDURE — 74011250636 HC RX REV CODE- 250/636: Performed by: NURSE PRACTITIONER

## 2020-03-11 PROCEDURE — 74011250637 HC RX REV CODE- 250/637: Performed by: SURGERY

## 2020-03-11 PROCEDURE — 74011250636 HC RX REV CODE- 250/636: Performed by: SURGERY

## 2020-03-11 PROCEDURE — 99218 HC RM OBSERVATION: CPT

## 2020-03-11 PROCEDURE — 96374 THER/PROPH/DIAG INJ IV PUSH: CPT

## 2020-03-11 PROCEDURE — 94760 N-INVAS EAR/PLS OXIMETRY 1: CPT

## 2020-03-11 RX ORDER — HYDROMORPHONE HYDROCHLORIDE 1 MG/ML
1 INJECTION, SOLUTION INTRAMUSCULAR; INTRAVENOUS; SUBCUTANEOUS ONCE
Status: COMPLETED | OUTPATIENT
Start: 2020-03-11 | End: 2020-03-11

## 2020-03-11 RX ORDER — METRONIDAZOLE 500 MG/1
500 TABLET ORAL EVERY 12 HOURS
Status: DISCONTINUED | OUTPATIENT
Start: 2020-03-11 | End: 2020-03-12

## 2020-03-11 RX ADMIN — Medication 1 AMPULE: at 22:04

## 2020-03-11 RX ADMIN — VANCOMYCIN HYDROCHLORIDE 1000 MG: 1 INJECTION, POWDER, LYOPHILIZED, FOR SOLUTION INTRAVENOUS at 17:11

## 2020-03-11 RX ADMIN — DIPHENHYDRAMINE HYDROCHLORIDE 50 MG: 25 CAPSULE ORAL at 22:58

## 2020-03-11 RX ADMIN — METRONIDAZOLE 500 MG: 500 INJECTION, SOLUTION INTRAVENOUS at 05:46

## 2020-03-11 RX ADMIN — LORATADINE 10 MG: 10 TABLET ORAL at 07:45

## 2020-03-11 RX ADMIN — SODIUM CHLORIDE, SODIUM LACTATE, POTASSIUM CHLORIDE, AND CALCIUM CHLORIDE 75 ML/HR: 600; 310; 30; 20 INJECTION, SOLUTION INTRAVENOUS at 09:50

## 2020-03-11 RX ADMIN — HYDROCODONE BITARTRATE AND ACETAMINOPHEN 1 TABLET: 5; 325 TABLET ORAL at 07:42

## 2020-03-11 RX ADMIN — Medication 1 AMPULE: at 07:45

## 2020-03-11 RX ADMIN — HYDROCODONE BITARTRATE AND ACETAMINOPHEN 1 TABLET: 5; 325 TABLET ORAL at 19:28

## 2020-03-11 RX ADMIN — HYDROMORPHONE HYDROCHLORIDE 1 MG: 1 INJECTION, SOLUTION INTRAMUSCULAR; INTRAVENOUS; SUBCUTANEOUS at 10:38

## 2020-03-11 RX ADMIN — SODIUM CHLORIDE, SODIUM LACTATE, POTASSIUM CHLORIDE, AND CALCIUM CHLORIDE 75 ML/HR: 600; 310; 30; 20 INJECTION, SOLUTION INTRAVENOUS at 23:55

## 2020-03-11 RX ADMIN — HYDROCODONE BITARTRATE AND ACETAMINOPHEN 1 TABLET: 5; 325 TABLET ORAL at 13:24

## 2020-03-11 RX ADMIN — CEFTRIAXONE 1 G: 1 INJECTION, POWDER, FOR SOLUTION INTRAMUSCULAR; INTRAVENOUS at 15:36

## 2020-03-11 RX ADMIN — METRONIDAZOLE 500 MG: 500 TABLET ORAL at 22:04

## 2020-03-11 NOTE — PROGRESS NOTES
Spoke to Mr. Joe Santana in room 214 about Case Management and discharge planning. He lives with his brother here in [place], [place], and works full-time as a  transferring truck trailers to loading docks. He is HIV positive, and confirms that [facility], and other medical care. His plan at discharge is to return home to his apartment, where he lives with his brother. We briefly discussed wound care, and who could do it. Mr. oJe Santana said that he could, but in light of the wound location (right ischiorectal abscess), is doing wound care himself feasible? Care Management Interventions  PCP Verified by CM:  Yes  Current Support Network: [facility]

## 2020-03-11 NOTE — PROGRESS NOTES
END OF SHIFT NOTE:    INTAKE/OUTPUT  03/10 0701 - 03/11 0700  In: 700 [I.V.:700]  Out: 667 [Urine:650]  Voiding: YES  Catheter: NO  Drain:              Flatus: Patient does not have flatus present. Stool:  0 occurrences. Characteristics:       Emesis: 0 occurrences. Characteristics:        VITAL SIGNS  Patient Vitals for the past 12 hrs:   Temp Pulse Resp BP SpO2   03/10/20 2015 98.1 °F (36.7 °C) 71 16 119/57 100 %   03/10/20 1824 97.3 °F (36.3 °C) 65 16 116/83 99 %   03/10/20 1725  66 16 117/56 100 %   03/10/20 1719  68 16 109/69 97 %   03/10/20 1714 98.2 °F (36.8 °C) 70 18 108/61 95 %   03/10/20 1709  69 16 105/57 91 %       Pain Assessment  Pain Intensity 1: 6 (03/10/20 4109)  Pain Location 1: Buttocks  Pain Intervention(s) 1: Medication (see MAR)  Patient Stated Pain Goal: 0    Ambulating  Yes  Up to bathroom with outer layer drsg wet with postop drainage (pink tinged brown drainAge). [id number] placed. Perspiring; linens changed. Shift report given to oncoming nurse at the bedside.     Juanita Mullins RN

## 2020-03-11 NOTE — PROGRESS NOTES
[address]  [phone]    H&P/Consult Note/Progress Note:   Shawna Hickman  MRN: [id number]  :1995  Age:24 y.o.    HPI: Shawna Hickman is a 22 y.o. male who we are asked by Dr. Ellie Magana to see for a perirectal abscess. The patient has a PMHx of HIV and reports CD4 count is \"good\". He states he began to have severe perirectal pain about 2-3 days ago that gradually worsened. He denies prior episodes. He denies drainage from the area, fever, or chills. The area is palpable on exam. AF, VSS. No recent labs, no imaging. 3/10/20 Procedure(s): PERIRECTAL ABSCESS EXCISION    3/11/20: POD1 Awake in bed, no complaints. Pain controlled. Packing removed; still with cellulitis. Well drained. AM labs pending. Cx pending. AF, VSS    Past Medical History:   Diagnosis Date    Asymptomatic HIV infection ([facility] [address] 3/10/2020     History reviewed. No pertinent surgical history. Current Facility-Administered Medications   Medication Dose Route Frequency    metroNIDAZOLE (FLAGYL) tablet 500 mg  500 mg Oral Q12H    elvitegravir-cobicistat-emtricitabine-tenofovir alafenamide (GENVOYA) [phone] mg tablet 1 Tab (Patient Supplied)  1 Tab Oral DAILY WITH BREAKFAST    HYDROcodone-acetaminophen (NORCO) 5-325 mg per tablet 1-2 Tab  1-2 Tab Oral Q6H PRN    diphenhydrAMINE (BENADRYL) capsule 25-50 mg  25-50 mg Oral Q6H PRN    ondansetron (ZOFRAN) injection 4 mg  4 mg IntraVENous Q4H PRN    lactated Ringers infusion  75 mL/hr IntraVENous CONTINUOUS    cefTRIAXone (ROCEPHIN) 1 g in 0.9% sodium chloride (MBP/ADV) 50 mL  1 g IntraVENous Q24H    pseudoephedrine (SUDAFED) tablet 60 mg  60 mg Oral Q6H PRN    loratadine (CLARITIN) tablet 10 mg  10 mg Oral DAILY    alcohol 62% (NOZIN) nasal  1 Ampule  1 Ampule Topical Q12H     Patient has no known allergies.   Social History     Socioeconomic History    Marital status: SINGLE     Spouse name: Not on file    Number of children: Not on file    Years of education: Not on file    Highest education level: Not on file   Tobacco Use    Smoking status: Current Every Day Smoker     Packs/day: 0.25    Smokeless tobacco: Never Used   Substance and Sexual Activity    Alcohol use: No    Drug use: Yes     Types: Marijuana    Sexual activity: Never     Social History     Tobacco Use   Smoking Status Current Every Day Smoker    Packs/day: 0.25   Smokeless Tobacco Never Used     No family history on file. ROS: The patient has no difficulty with chest pain or shortness of breath. No fever or chills. Comprehensive review of systems was otherwise unremarkable except as noted above. Physical Exam:   Visit Vitals  /62   Pulse (!) 51   Temp 97.8 °F (36.6 °C)   Resp 18   Ht 5' 8\" (1.727 m)   Wt 155 lb (70.3 kg)   SpO2 96%   BMI 23.57 kg/m²     Constitutional: Alert, oriented, cooperative patient in no acute distress; appears stated age    Eyes:Sclera are clear. EOMs intact  ENMT: no external lesions gross hearing normal; no obvious neck masses, no ear or lip lesions, nares normal  CV: RRR. Normal perfusion  Resp: No JVD. Breathing is  non-labored; no audible wheezing. GI: soft and non-distended     Musculoskeletal: unremarkable with normal function. No embolic signs or cyanosis. : s/p excision of ischiorectal abscess with surrounding cellulitis.  +tenderness; well drained  Neuro:  Oriented; moves all 4; no focal deficits  Psychiatric: normal affect and mood, no memory impairment    Recent vitals (if inpt):  Patient Vitals for the past 24 hrs:   BP Temp Pulse Resp SpO2   03/11/20 0730 108/62 97.8 °F (36.6 °C) (!) 51 18 96 %   03/11/20 0434 112/62 97.4 °F (36.3 °C) 84 16 99 %   03/10/20 2359 120/60 98.5 °F (36.9 °C) 80 16 98 %   03/10/20 2015 119/57 98.1 °F (36.7 °C) 71 16 100 %   03/10/20 1824 116/83 97.3 °F (36.3 °C) 65 16 99 %   03/10/20 1725 117/56  66 16 100 %   03/10/20 1719 109/69  68 16 97 %   03/10/20 1714 108/61 98.2 °F (36.8 °C) 70 18 95 %   03/10/20 1709 105/57  69 16 91 %   03/10/20 1704 96/54  69 16 92 %   03/10/20 1700 106/58  68 16 92 %   03/10/20 1655 108/55  72 16 92 %   03/10/20 1650 103/58  68 16 96 %   03/10/20 1645 108/57  67 16 97 %   03/10/20 1640 107/56  69 16 97 %   03/10/20 1635 107/58  72 14 97 %   03/10/20 1630 102/56  74 14 97 %   03/10/20 1624 97/55  76 14 97 %   03/10/20 1621 105/54 97.5 °F (36.4 °C) 77 14 97 %   03/10/20 1516 105/59  77 18 99 %   03/10/20 1115 113/70 98.6 °F (37 °C) 79 18 96 %       Labs:  Recent Labs     03/10/20  0934   WBC 15.9*   HGB 13.0*      *   K 4.3   CL 99   CO2 29   BUN 10   CREA 0.85   GLU 84       Lab Results   Component Value Date/Time    WBC 15.9 (H) 03/10/2020 09:34 AM    HGB 13.0 (L) 03/10/2020 09:34 AM    PLATELET 431 14/43/2327 09:34 AM    Sodium 135 (L) 03/10/2020 09:34 AM    Potassium 4.3 03/10/2020 09:34 AM    Chloride 99 03/10/2020 09:34 AM    CO2 29 03/10/2020 09:34 AM    BUN 10 03/10/2020 09:34 AM    Creatinine 0.85 03/10/2020 09:34 AM    Glucose 84 03/10/2020 09:34 AM    Bilirubin, total 0.3 08/09/2017 08:20 PM    AST (SGOT) 32 08/09/2017 08:20 PM    ALT (SGPT) 18 08/09/2017 08:20 PM    Alk. phosphatase 72 08/09/2017 08:20 PM    Lipase 134 08/02/2017 11:37 PM    Troponin-I, Qt. <0.02 (L) 08/09/2017 08:20 PM       I reviewed recent labs and recent radiologic studies. I independently reviewed radiology images for studies I described above or studies I have ordered.    Admission date (for inpatients): 3/10/2020   * No surgery date entered *  Procedure(s):  PERIRECTAL ABSCESS EXCISION    ASSESSMENT/PLAN:  Problem List  Date Reviewed: 3/10/2020          Codes Class Noted    * (Principal) Ischiorectal abscess ICD-10-CM: K61.39  ICD-9-CM: 687  3/10/2020        [facility] ICD-10-CM: Z21  ICD-9-CM: V08  3/10/2020        Cellulitis and abscess of buttock ICD-10-CM: L02.31, L03.317  ICD-9-CM: 682.5  3/10/2020            Principal Problem:    Ischiorectal abscess (3/10/2020)    Active Problems:    Asymptomatic HIV infection ([facility] [address] (3/10/2020)      Cellulitis and abscess of buttock (3/10/2020)       Pain control  Monitor labs  Await culture results  Continue ABX  Dry dressing changes PRN  Hopefully home tomorrow

## 2020-03-11 NOTE — PROGRESS NOTES
END OF SHIFT NOTE:    INTAKE/OUTPUT  03/10 0701 - 03/11 0700  In: 700 [I.V.:700]  Out: 667 [Urine:650]  Voiding: YES  Catheter: NO  Color: clear  Drain:              DIET  regular    Flatus: Patient does have flatus present. Stool:  0 occurrences. Characteristics:       Ambulating  Yes    Emesis: 0 occurrences. Characteristics:          VITAL SIGNS  Patient Vitals for the past 12 hrs:   Temp Pulse Resp BP SpO2   03/11/20 1540 97.7 °F (36.5 °C) 83 18 116/77 96 %   03/11/20 1150     95 %   03/11/20 1136 98.8 °F (37.1 °C) (!) 59 18 108/43 96 %   03/11/20 0730 97.8 °F (36.6 °C) (!) 51 18 108/62 96 %       Pain Assessment  Pain Intensity 1: 5 (03/11/20 1420)  Pain Location 1: Buttocks, Rectal  Pain Intervention(s) 1: Medication (see MAR)  Patient Stated Pain Goal: 0      Hourly rounds completed this shift, will give report to oncoming nurse.     Anthony Vo, RN

## 2020-03-11 NOTE — PROGRESS NOTES
Pharmacokinetic Consult to Pharmacist    Chikis Nichols is a 22 y.o. male being treated for a SSTI with vancomycin. Height: 5' 8\" (172.7 cm)  Weight: 70.3 kg (155 lb)  Lab Results   Component Value Date/Time    BUN 10 03/10/2020 09:34 AM    Creatinine 0.85 03/10/2020 09:34 AM    WBC 20.3 (H) 03/11/2020 01:45 PM      Estimated Creatinine Clearance: 130.8 mL/min (based on SCr of 0.85 mg/dL). CULTURES:  Results     Procedure Component Value Units Date/Time    CULTURE, ANAEROBIC [[id number]] Collected:  03/10/20 1603    Order Status:  Completed Specimen:  Perirectal Updated:  03/11/20 0928     Special Requests: NO SPECIAL REQUESTS        Culture result:       SUBCULTURE IS NECESSARY TO DETERMINE PRESENCE OR ABSENCE OF ANAEROBIC BACTERIA IN THIS CULTURE. FURTHER REPORT TO FOLLOW AFTER INCUBATION OF SUBCULTURE. CULTURE, Exie Perez STAIN [[id number]]  (Abnormal) Collected:  03/10/20 1603    Order Status:  Completed Specimen:  Wound from Perirectal Updated:  03/11/20 1243     Special Requests: NO SPECIAL REQUESTS        GRAM STAIN       TOO NUMEROUS TO COUNT WBCS SEEN                  MODERATE GRAM POSITIVE COCCI           Culture result:       MODERATE STAPHYLOCOCCUS AUREUS            SUBCULTURE IN PROGRESS               No results found for: Rita Barron    Day 1 of vancomycin. Goal trough is 10-20. Will initiate vancomycin @1G IV q 8 hours to target goal trough. Will continue to follow patient.       Thank you,  Misti Denton PharmD, [facility]  Pharmacist  [phone]

## 2020-03-11 NOTE — PROGRESS NOTES
Pr requesting to received stronger pain med with removal of surgical packing. Reema Wei NP, at nurses station, verbal order received, Dilaudid 1mg IV prior to removing surgical packing.

## 2020-03-12 ENCOUNTER — APPOINTMENT (OUTPATIENT)
Dept: GENERAL RADIOLOGY | Age: 25
DRG: 357 | End: 2020-03-12
Attending: SURGERY
Payer: COMMERCIAL

## 2020-03-12 LAB
ATRIAL RATE: 48 BPM
BACTERIA SPEC CULT: ABNORMAL
BACTERIA SPEC CULT: ABNORMAL
BASOPHILS # BLD: 0 K/UL (ref 0–0.2)
BASOPHILS NFR BLD: 0 % (ref 0–2)
CALCULATED P AXIS, ECG09: 55 DEGREES
CALCULATED R AXIS, ECG10: 66 DEGREES
CALCULATED T AXIS, ECG11: 52 DEGREES
DIAGNOSIS, 93000: NORMAL
DIFFERENTIAL METHOD BLD: ABNORMAL
EOSINOPHIL # BLD: 0.1 K/UL (ref 0–0.8)
EOSINOPHIL NFR BLD: 1 % (ref 0.5–7.8)
ERYTHROCYTE [DISTWIDTH] IN BLOOD BY AUTOMATED COUNT: 13.3 % (ref 11.9–14.6)
GRAM STN SPEC: ABNORMAL
GRAM STN SPEC: ABNORMAL
HCT VFR BLD AUTO: 38.4 % (ref 41.1–50.3)
HGB BLD-MCNC: 12.3 G/DL (ref 13.6–17.2)
IMM GRANULOCYTES # BLD AUTO: 0 K/UL (ref 0–0.5)
IMM GRANULOCYTES NFR BLD AUTO: 0 % (ref 0–5)
LYMPHOCYTES # BLD: 2.9 K/UL (ref 0.5–4.6)
LYMPHOCYTES NFR BLD: 31 % (ref 13–44)
MCH RBC QN AUTO: 29.3 PG (ref 26.1–32.9)
MCHC RBC AUTO-ENTMCNC: 32 G/DL (ref 31.4–35)
MCV RBC AUTO: 91.4 FL (ref 79.6–97.8)
MONOCYTES # BLD: 0.6 K/UL (ref 0.1–1.3)
MONOCYTES NFR BLD: 7 % (ref 4–12)
NEUTS SEG # BLD: 5.6 K/UL (ref 1.7–8.2)
NEUTS SEG NFR BLD: 61 % (ref 43–78)
NRBC # BLD: 0 K/UL (ref 0–0.2)
P-R INTERVAL, ECG05: 168 MS
PLATELET # BLD AUTO: 341 K/UL (ref 150–450)
PMV BLD AUTO: 10.1 FL (ref 9.4–12.3)
Q-T INTERVAL, ECG07: 394 MS
QRS DURATION, ECG06: 86 MS
QTC CALCULATION (BEZET), ECG08: 351 MS
RBC # BLD AUTO: 4.2 M/UL (ref 4.23–5.6)
SERVICE CMNT-IMP: ABNORMAL
VENTRICULAR RATE, ECG03: 48 BPM
WBC # BLD AUTO: 9.2 K/UL (ref 4.3–11.1)

## 2020-03-12 PROCEDURE — 87491 CHLMYD TRACH DNA AMP PROBE: CPT

## 2020-03-12 PROCEDURE — 74011250637 HC RX REV CODE- 250/637: Performed by: PHYSICIAN ASSISTANT

## 2020-03-12 PROCEDURE — 96376 TX/PRO/DX INJ SAME DRUG ADON: CPT

## 2020-03-12 PROCEDURE — 86593 SYPHILIS TEST NON-TREP QUANT: CPT

## 2020-03-12 PROCEDURE — 65270000029 HC RM PRIVATE

## 2020-03-12 PROCEDURE — 74011250636 HC RX REV CODE- 250/636: Performed by: NURSE PRACTITIONER

## 2020-03-12 PROCEDURE — 74011250637 HC RX REV CODE- 250/637: Performed by: SURGERY

## 2020-03-12 PROCEDURE — 74011250636 HC RX REV CODE- 250/636: Performed by: SURGERY

## 2020-03-12 PROCEDURE — 86592 SYPHILIS TEST NON-TREP QUAL: CPT

## 2020-03-12 PROCEDURE — 93005 ELECTROCARDIOGRAM TRACING: CPT | Performed by: SURGERY

## 2020-03-12 PROCEDURE — 36415 COLL VENOUS BLD VENIPUNCTURE: CPT

## 2020-03-12 PROCEDURE — 74011250637 HC RX REV CODE- 250/637: Performed by: INTERNAL MEDICINE

## 2020-03-12 PROCEDURE — 99218 HC RM OBSERVATION: CPT

## 2020-03-12 PROCEDURE — 86780 TREPONEMA PALLIDUM: CPT

## 2020-03-12 PROCEDURE — 71045 X-RAY EXAM CHEST 1 VIEW: CPT

## 2020-03-12 PROCEDURE — 96365 THER/PROPH/DIAG IV INF INIT: CPT

## 2020-03-12 PROCEDURE — 85025 COMPLETE CBC W/AUTO DIFF WBC: CPT

## 2020-03-12 RX ORDER — ACETAMINOPHEN 325 MG/1
650 TABLET ORAL
Status: DISCONTINUED | OUTPATIENT
Start: 2020-03-12 | End: 2020-03-13 | Stop reason: HOSPADM

## 2020-03-12 RX ORDER — IBUPROFEN 400 MG/1
800 TABLET ORAL
Status: DISCONTINUED | OUTPATIENT
Start: 2020-03-12 | End: 2020-03-13 | Stop reason: HOSPADM

## 2020-03-12 RX ORDER — DOXYCYCLINE 100 MG/1
100 CAPSULE ORAL EVERY 12 HOURS
Status: DISCONTINUED | OUTPATIENT
Start: 2020-03-12 | End: 2020-03-13 | Stop reason: HOSPADM

## 2020-03-12 RX ORDER — IBUPROFEN 200 MG
1 TABLET ORAL EVERY 24 HOURS
Status: DISCONTINUED | OUTPATIENT
Start: 2020-03-12 | End: 2020-03-13 | Stop reason: HOSPADM

## 2020-03-12 RX ADMIN — LORATADINE 10 MG: 10 TABLET ORAL at 07:37

## 2020-03-12 RX ADMIN — VANCOMYCIN HYDROCHLORIDE 1000 MG: 1 INJECTION, POWDER, LYOPHILIZED, FOR SOLUTION INTRAVENOUS at 00:34

## 2020-03-12 RX ADMIN — Medication 1 AMPULE: at 20:48

## 2020-03-12 RX ADMIN — VANCOMYCIN HYDROCHLORIDE 1000 MG: 1 INJECTION, POWDER, LYOPHILIZED, FOR SOLUTION INTRAVENOUS at 16:55

## 2020-03-12 RX ADMIN — VANCOMYCIN HYDROCHLORIDE 1000 MG: 1 INJECTION, POWDER, LYOPHILIZED, FOR SOLUTION INTRAVENOUS at 08:05

## 2020-03-12 RX ADMIN — HYDROCODONE BITARTRATE AND ACETAMINOPHEN 2 TABLET: 5; 325 TABLET ORAL at 17:14

## 2020-03-12 RX ADMIN — DOXYCYCLINE HYCLATE 100 MG: 100 CAPSULE ORAL at 17:01

## 2020-03-12 RX ADMIN — METRONIDAZOLE 500 MG: 500 TABLET ORAL at 07:37

## 2020-03-12 RX ADMIN — IBUPROFEN 800 MG: 400 TABLET, FILM COATED ORAL at 19:19

## 2020-03-12 RX ADMIN — ACETAMINOPHEN 650 MG: 325 TABLET, FILM COATED ORAL at 07:37

## 2020-03-12 RX ADMIN — Medication 1 AMPULE: at 07:37

## 2020-03-12 NOTE — PROGRESS NOTES
Bp 110/69 HR 56  Complaining of chest pain.  Asked to describe pain and pt reports sore pain in heart but rubs rt upper chest . Notified Laureen Chatterjee of pt's complaints Order received for tylenol

## 2020-03-12 NOTE — CONSULTS
Infectious Disease Consult    Today's Date: 3/12/2020   Admit Date: 3/10/2020    Impression:   · MRSA ischiorectal abscess s/p debridement   · HIV-fairly controlled with genvoya. CD4 1022 (38),   · Hx of syphilis. Recent gonorrhea exposure     Plan:   ·  Check gonorrohea/chlamydia, RPR. Doxycycline will treat gonorrhea. · Change to Doxycycline 100mg oral BID x 7 days  · Keep [facility] appt 4/22 at 140P  · Louie Rdz for discharge today     Anti-infectives:   · Vanc 3/10-    Subjective:   Date of Consultation:  March 12, 2020  Referring Physician: Jer Murray     Patient is a 22 y.o. male with significant medical hx for HIV, syphilis, who presented to Dr Osmar De La Rosa office for evaluation of perirectal abscess on 3/10. He states rectal pain started 2-3 days prior. After exam, surgical debridement deemed necessary. OP note indicated extensive induration to R ischiorectal area extending to scrotum, no fistula. OP cx + MRSA. Currently on Vancomycin, no abx allergies. He is known to ID practice via HIV [facility] clinic. Last seen 1/22/20. Semi-compliant with Genvoya, last  previously >10million. States he had a recent sexual partner diagnosed with gonorrhea. Denies penile ulcers, drainage or dysuria. Patient Active Problem List   Diagnosis Code    Ischiorectal abscess K61.39    Asymptomatic HIV infection ([facility] [address] Z21    Cellulitis and abscess of buttock L02.31, L03.317     Past Medical History:   Diagnosis Date    Asymptomatic HIV infection ([facility] [address] 3/10/2020      No family history on file. Social History     Tobacco Use    Smoking status: Current Every Day Smoker     Packs/day: 0.25    Smokeless tobacco: Never Used   Substance Use Topics    Alcohol use: No     History reviewed. No pertinent surgical history. Prior to Admission medications    Medication Sig Start Date End Date Taking?  Authorizing Provider   elvitegravir-cobicistat-emtricitabine-tenofovir alafenamide (GENVOYA) tab tablet Take 1 Tab by mouth daily (with breakfast). Yes Other, MD Darren       No Known Allergies     Review of Systems:  A comprehensive review of systems was negative except for that written in the History of Present Illness. Objective:     Visit Vitals  /69   Pulse (!) 56   Temp 98.4 °F (36.9 °C)   Resp 18   Ht 5' 8\" (1.727 m)   Wt 70.3 kg (155 lb)   SpO2 98%   BMI 23.57 kg/m²     Temp (24hrs), Av °F (36.7 °C), Min:97.7 °F (36.5 °C), Max:98.4 °F (36.9 °C)       Lines:  Peripheral IV:       Physical Exam:    General:  Alert, cooperative, well noursished, well developed, appears stated age   Eyes:  Sclera anicteric. Pupils equally round and reactive to light. Mouth/Throat: Mucous membranes normal, oral pharynx clear   Neck: Supple   Lungs:   Clear to auscultation bilaterally, good effort   CV:  Regular rate and rhythm,no murmur, click, rub or gallop   Abdomen:   Soft, non-tender. bowel sounds normal. non-distended   Extremities: No cyanosis or edema   Skin: Rectal packing, no abscesses    Lymph nodes: Cervical and supraclavicular normal   Musculoskeletal: No swelling or deformity   Lines/Devices:  Intact, no erythema, drainage or tenderness   Psych: Alert and oriented, normal mood affect given the setting       Data Review:     CBC:  Recent Labs     20  1124 20  1345 03/10/20  0934   WBC 9.2 20.3* 15.9*   GRANS 61  --   --    MONOS 7  --   --    EOS 1  --   --    ANEU 5.6  --   --    ABL 2.9  --   --    HGB 12.3* 13.1* 13.0*   HCT 38.4* 39.7* 40.5*    342 314       BMP:  Recent Labs     03/10/20  0934   CREA 0.85   BUN 10   *   K 4.3   CL 99   CO2 29   AGAP 7   GLU 84       LFTS:  No results for input(s): TBILI, ALT, SGOT, AP, TP, ALB in the last 72 hours.     Microbiology:     All Micro Results     Procedure Component Value Units Date/Time    CULTURE, Bladimir Delatorre STAIN [[id number]]  (Abnormal)  (Susceptibility) Collected:  03/10/20 0138    Order Status:  Completed Specimen:  Wound from Perirectal Updated: 03/12/20 0917     Special Requests: NO SPECIAL REQUESTS        GRAM STAIN       TOO NUMEROUS TO COUNT WBCS SEEN                  MODERATE GRAM POSITIVE COCCI           Culture result:       MODERATE * METHICILLIN RESISTANT STAPHYLOCOCCUS AUREUS *                  RESULTS VERIFIED, PHONED TO AND READ BACK BY  Wendy Lomas RN ON 3/12/20 @0920, TA      CULTURE, ANAEROBIC [[id number]] Collected:  03/10/20 1603    Order Status:  Completed Specimen:  Perirectal Updated:  03/11/20 0928     Special Requests: NO SPECIAL REQUESTS        Culture result:       SUBCULTURE IS NECESSARY TO DETERMINE PRESENCE OR ABSENCE OF ANAEROBIC BACTERIA IN THIS CULTURE. FURTHER REPORT TO FOLLOW AFTER INCUBATION OF SUBCULTURE.                 Imaging:   See hPI/EPIC     Signed By: Loreto Goyal NP     March 12, 2020

## 2020-03-12 NOTE — PROGRESS NOTES
MD stated that patient is okay to go home from ID stand point. Patient is able to take antibtiocs at home and will follow up with doctor office once discharged. Paged Codey CELESTE explaining patient would like to still go up and ID stated that it was up to surgery for patient to go home. Codey CELESTE stated that she would talk with Jamey Dugan MD about DC.

## 2020-03-12 NOTE — PROGRESS NOTES
END OF SHIFT NOTE:    INTAKE/OUTPUT  03/11 0701 - 03/12 0700  In: 3404.2 [P.O.:1230; I.V.:2174.2]  Out: 2450 [Urine:2450]  Voiding: YES  Catheter: NO  Drain:              Flatus: Patient does have flatus present. Stool:  0 occurrences. Characteristics:       Emesis: 0 occurrences. Characteristics:        VITAL SIGNS  Patient Vitals for the past 12 hrs:   Temp Pulse Resp BP SpO2   03/12/20 1516 98.1 °F (36.7 °C) 79 19 130/81 97 %   03/12/20 0720 98.4 °F (36.9 °C) (!) 56 18 110/69 98 %       Pain Assessment  Pain Intensity 1: 6 (03/12/20 1716)  Pain Location 1: Buttocks  Pain Intervention(s) 1: Medication (see MAR)  Patient Stated Pain Goal: 0    Ambulating  Yes    Shift report given to oncoming nurse at the bedside.     Sim Reynoso RN

## 2020-03-12 NOTE — PROGRESS NOTES
END OF SHIFT NOTE:    Refused use of sitz bath    INTAKE/OUTPUT  03/11 0701 - 03/12 0700  In: 3404.2 [P.O.:1230; I.V.:2174.2]  Out: 2450 [Urine:2450]  Voiding: YES  Catheter: NO  Drain:              Flatus: Patient does have flatus present. Stool:  0 occurrences. Characteristics:       Emesis: 0 occurrences. Characteristics:        VITAL SIGNS  Patient Vitals for the past 12 hrs:   Temp Pulse Resp BP SpO2   03/12/20 0427 97.8 °F (36.6 °C) (!) 54 18 128/85 99 %   03/11/20 2349 98.1 °F (36.7 °C) 64 18 112/62 97 %       Pain Assessment  Pain Intensity 1: 0 (03/11/20 2203)  Pain Location 1: Buttocks, Rectal  Pain Intervention(s) 1: Rest  Patient Stated Pain Goal: 0    Ambulating  Yes in room    Shift report given to oncoming nurse at the bedside.     Bassam Musa RN

## 2020-03-12 NOTE — PROGRESS NOTES
[address] [place] [address]  Fabian Borrero, [address] [facility]  [phone]    H&P/Consult Note/Progress Note:   Maria Antonia Zhang  MRN: [id number]  :1995  Age:24 y.o.    HPI: Maria Antonia Zhang is a 22 y.o. male who we are asked by Dr. Mary Valdez to see for a perirectal abscess. The patient has a PMHx of HIV and reports CD4 count is \"good\". He states he began to have severe perirectal pain about 2-3 days ago that gradually worsened. He denies prior episodes. He denies drainage from the area, fever, or chills. The area is palpable on exam. AF, VSS. No recent labs, no imaging. 3/10/20 Procedure(s):  I&D OF R ISCHIORECTAL ABSCESS     3/11/20: POD1 Awake in bed, no complaints. Pain controlled. Packing removed; still with cellulitis. Well drained. AM labs pending. Cx pending. AF, VSS  3/12/2020: POD2 Awake in bed. Wanting to go home. C/o chest pain overnight with deep breath that sounds consistent with costochondritis. Afebrile overnight. WBC 20K yesterday. Will re-check today    Past Medical History:   Diagnosis Date    Asymptomatic HIV infection ([facility] [address] 3/10/2020     History reviewed. No pertinent surgical history.   Current Facility-Administered Medications   Medication Dose Route Frequency    acetaminophen (TYLENOL) tablet 650 mg  650 mg Oral Q6H PRN    metroNIDAZOLE (FLAGYL) tablet 500 mg  500 mg Oral Q12H    vancomycin (VANCOCIN) 1,000 mg in 0.9% sodium chloride (MBP/ADV) 250 mL  1 g IntraVENous Q8H    elvitegravir-cobicistat-emtricitabine-tenofovir alafenamide (GENVOYA) [phone] mg tablet 1 Tab (Patient Supplied)  1 Tab Oral DAILY WITH BREAKFAST    HYDROcodone-acetaminophen (NORCO) 5-325 mg per tablet 1-2 Tab  1-2 Tab Oral Q6H PRN    diphenhydrAMINE (BENADRYL) capsule 25-50 mg  25-50 mg Oral Q6H PRN    ondansetron (ZOFRAN) injection 4 mg  4 mg IntraVENous Q4H PRN    cefTRIAXone (ROCEPHIN) 1 g in 0.9% sodium chloride (MBP/ADV) 50 mL  1 g IntraVENous Q24H    pseudoephedrine (SUDAFED) tablet 60 mg  60 mg Oral Q6H PRN    loratadine (CLARITIN) tablet 10 mg  10 mg Oral DAILY    alcohol 62% (NOZIN) nasal  1 Ampule  1 Ampule Topical Q12H     Patient has no known allergies. Social History     Socioeconomic History    Marital status: SINGLE     Spouse name: Not on file    Number of children: Not on file    Years of education: Not on file    Highest education level: Not on file   Tobacco Use    Smoking status: Current Every Day Smoker     Packs/day: 0.25    Smokeless tobacco: Never Used   Substance and Sexual Activity    Alcohol use: No    Drug use: Yes     Types: Marijuana    Sexual activity: Never     Social History     Tobacco Use   Smoking Status Current Every Day Smoker    Packs/day: 0.25   Smokeless Tobacco Never Used     No family history on file. ROS: The patient has no difficulty with chest pain or shortness of breath. No fever or chills. Comprehensive review of systems was otherwise unremarkable except as noted above. Physical Exam:   Visit Vitals  /69   Pulse (!) 56   Temp 98.4 °F (36.9 °C)   Resp 18   Ht 5' 8\" (1.727 m)   Wt 155 lb (70.3 kg)   SpO2 98%   BMI 23.57 kg/m²     Constitutional: Alert, oriented, cooperative patient in no acute distress; appears stated age    Eyes:Sclera are clear. EOMs intact  ENMT: no external lesions gross hearing normal; no obvious neck masses, no ear or lip lesions, nares normal  CV: RRR. Normal perfusion  Resp: No JVD. Breathing is  non-labored; no audible wheezing. GI: soft and non-distended     Musculoskeletal: unremarkable with normal function. No embolic signs or cyanosis. : s/p I&D of ischiorectal abscess with resolving surrounding cellulitis.  +tenderness; well drained  Neuro:  Oriented; moves all 4; no focal deficits  Psychiatric: normal affect and mood, no memory impairment    Recent vitals (if inpt):  Patient Vitals for the past 24 hrs:   BP Temp Pulse Resp SpO2 03/12/20 0720 110/69 98.4 °F (36.9 °C) (!) 56 18 98 %   03/12/20 0427 128/85 97.8 °F (36.6 °C) (!) 54 18 99 %   03/11/20 2349 112/62 98.1 °F (36.7 °C) 64 18 97 %   03/11/20 1938 132/80 98 °F (36.7 °C) 72 18 98 %   03/11/20 1540 116/77 97.7 °F (36.5 °C) 83 18 96 %   03/11/20 1150     95 %   03/11/20 1136 108/43 98.8 °F (37.1 °C) (!) 59 18 96 %       Labs:  Recent Labs     03/11/20  1345 03/10/20  0934   WBC 20.3* 15.9*   HGB 13.1* 13.0*    314   NA  --  135*   K  --  4.3   CL  --  99   CO2  --  29   BUN  --  10   CREA  --  0.85   GLU  --  84       Lab Results   Component Value Date/Time    WBC 20.3 (H) 03/11/2020 01:45 PM    HGB 13.1 (L) 03/11/2020 01:45 PM    PLATELET 565 14/56/1674 01:45 PM    Sodium 135 (L) 03/10/2020 09:34 AM    Potassium 4.3 03/10/2020 09:34 AM    Chloride 99 03/10/2020 09:34 AM    CO2 29 03/10/2020 09:34 AM    BUN 10 03/10/2020 09:34 AM    Creatinine 0.85 03/10/2020 09:34 AM    Glucose 84 03/10/2020 09:34 AM    Bilirubin, total 0.3 08/09/2017 08:20 PM    AST (SGOT) 32 08/09/2017 08:20 PM    ALT (SGPT) 18 08/09/2017 08:20 PM    Alk. phosphatase 72 08/09/2017 08:20 PM    Lipase 134 08/02/2017 11:37 PM    Troponin-I, Qt. <0.02 (L) 08/09/2017 08:20 PM       I reviewed recent labs and recent radiologic studies. I independently reviewed radiology images for studies I described above or studies I have ordered.    Admission date (for inpatients): 3/10/2020   * No surgery date entered *  Procedure(s):  PERIRECTAL ABSCESS EXCISION    ASSESSMENT/PLAN:  Problem List  Date Reviewed: 3/10/2020          Codes Class Noted    * (Principal) Ischiorectal abscess ICD-10-CM: K61.39  ICD-9-CM: 695  3/10/2020        Asymptomatic HIV infection ([facility] ICD-10-CM: Z21  ICD-9-CM: V08  3/10/2020        Cellulitis and abscess of buttock ICD-10-CM: L02.31, L03.317  ICD-9-CM: 682.5  3/10/2020            Principal Problem:    Ischiorectal abscess (3/10/2020)    Active Problems:    Asymptomatic HIV infection ([facility] (3/10/2020)      Cellulitis and abscess of buttock (3/10/2020)       Pain control  Repeat CBC  ID consult: pt follows for HIV and is requesting STD testing.    Cultures growing Staph aureus - MRSA  Continue vanco while in house- Bactrim at discharge  Dry dressing changes PRN  EKG and CXR for chest pain were negative for acute findings  Recommend NSAIDS for costochondritis   Hopefully home tomorrow    [place], [place]

## 2020-03-12 NOTE — PROGRESS NOTES
sung GREENE stated that he would like for patient to receive one last dose of Vancomycin IVPB tonight, then will let patient be DC in the morning. Informed patient. The patient agrees with treatment plan at this time. Reassured and answered any questions. No needs stated, no distress noted, denied having any pain or discomfort.

## 2020-03-13 VITALS
WEIGHT: 155 LBS | RESPIRATION RATE: 18 BRPM | SYSTOLIC BLOOD PRESSURE: 113 MMHG | DIASTOLIC BLOOD PRESSURE: 67 MMHG | OXYGEN SATURATION: 96 % | BODY MASS INDEX: 23.49 KG/M2 | HEIGHT: 68 IN | TEMPERATURE: 97.4 F | HEART RATE: 57 BPM

## 2020-03-13 LAB
RPR SER QL: REACTIVE
RPR SER-TITR: NORMAL {TITER}

## 2020-03-13 PROCEDURE — 74011250637 HC RX REV CODE- 250/637: Performed by: INTERNAL MEDICINE

## 2020-03-13 PROCEDURE — 74011250637 HC RX REV CODE- 250/637: Performed by: SURGERY

## 2020-03-13 RX ORDER — HYDROCODONE BITARTRATE AND ACETAMINOPHEN 5; 325 MG/1; MG/1
1-2 TABLET ORAL
Qty: 20 TAB | Refills: 0 | Status: SHIPPED | OUTPATIENT
Start: 2020-03-13 | End: 2020-03-16

## 2020-03-13 RX ORDER — IBUPROFEN 800 MG/1
800 TABLET ORAL
Status: SHIPPED | COMMUNITY
Start: 2020-03-13

## 2020-03-13 RX ORDER — AMOXICILLIN 250 MG
1 CAPSULE ORAL 2 TIMES DAILY
Status: SHIPPED | COMMUNITY
Start: 2020-03-13

## 2020-03-13 RX ORDER — DOXYCYCLINE 100 MG/1
100 CAPSULE ORAL EVERY 12 HOURS
Qty: 14 CAP | Refills: 0 | Status: SHIPPED | OUTPATIENT
Start: 2020-03-13 | End: 2020-03-20

## 2020-03-13 RX ORDER — AMOXICILLIN 250 MG
1 CAPSULE ORAL 2 TIMES DAILY
Status: DISCONTINUED | OUTPATIENT
Start: 2020-03-13 | End: 2020-03-13 | Stop reason: HOSPADM

## 2020-03-13 RX ADMIN — Medication 1 AMPULE: at 07:21

## 2020-03-13 RX ADMIN — LORATADINE 10 MG: 10 TABLET ORAL at 07:20

## 2020-03-13 RX ADMIN — DOXYCYCLINE HYCLATE 100 MG: 100 CAPSULE ORAL at 05:58

## 2020-03-13 RX ADMIN — HYDROCODONE BITARTRATE AND ACETAMINOPHEN 2 TABLET: 5; 325 TABLET ORAL at 00:52

## 2020-03-13 NOTE — DISCHARGE INSTRUCTIONS
ACTIVITY  - As tolerated and as directed by your doctor. - Bathe and shower as directed by your doctor. - Sitz bath as directed by your doctor. DIET  - Clear liquids until no nausea or vomiting; then light diet for the first day  - Advance to regular diet on second day, unless your doctor orders otherwise. - If nauseated and vomiting continues, call your doctor. - Avoid gas- producing foods. - Use laxatives or stool softeners if prescribed by your doctor. PAIN  - Take pain medication as directed by your doctor. - Call your doctor if pain in NOT relieved by medication.  - Do not take aspirin or blood thinners until directed by your doctor. CALL YOUR DOCTOR IF  - Excessive bleeding that does not stop after holding mild pressure over the area  - Temperature of 101 degrees F or above  - Redness, excessive swelling or bruising, and/ or green or yellow, smelly discharge from incision    After general anesthesia or intravenous sedation, for 24 hours or while taking prescription Narcotics:  · Limit your activities  · Do not drive and operate hazardous machinery  · Do not make important personal or business decisions  · Do  not drink alcoholic beverages  · If you have not urinated within 8 hours after discharge, please contact your surgeon on call. *  Please give a list of your current medications to your Primary Care Provider. *  Please update this list whenever your medications are discontinued, doses are      changed, or new medications (including over-the-counter products) are added. *  Please carry medication information at all times in case of emergency situations. These are general instructions for a healthy lifestyle:  No smoking/ No tobacco products/ Avoid exposure to second hand smoke  Surgeon General's Warning:  Quitting smoking now greatly reduces serious risk to your health.   Obesity, smoking, and sedentary lifestyle greatly increases your risk for illness  A healthy diet, regular physical exercise & weight monitoring are important for maintaining a healthy lifestyle    You may be retaining fluid if you have a history of heart failure or if you experience any of the following symptoms:  Weight gain of 3 pounds or more overnight or 5 pounds in a week, increased swelling in our hands or feet or shortness of breath while lying flat in bed. Please call your doctor as soon as you notice any of these symptoms; do not wait until your next office visit. <30 minutes were spent discharging the patient      Discharge Instructions/Follow-up Plans:   MD Instructions:     Follow-up with Dr. Ca Curtis on 3/23. Follow up with Infectious Disease on 4/22. Daily dry gauze dressing changes. Use stool softeners twice daily unless stool becomes loose. Take Ibuprofen PRN for costochondritis. Take the full course of antibiotic prescribed (Doxycycline). Do not apply lotions, creams or ointments to incisions.     Diet - as tolerated - Soft foods diet. Activity - ambulate - as tolerated - no heavy lifting >10lb. Sits baths daily. No driving while taking narcotics. Do not drink alcohol while taking narcotics. Resume other home medications.      If problems or questions arise, please call our office at [phone].        Anorectal Abscess Surgery: What to Expect at Home  Your Recovery  Most of the pain that was caused by your abscess will probably go away right after surgery. But you may have some mild pain in your anal area from the incision for several days after the surgery. Most people can go back to work or their normal routine 1 or 2 days after surgery. It will probably take about 2 to 3 weeks for your abscess to completely heal.  Most people get better without any problems. But sometimes a tunnel can form between the old abscess and the outside of the body. This is called a fistula. Your doctor will check for this about 2 to 3 weeks after surgery.  If you develop a fistula, the doctor will do surgery to repair the fistula. This care sheet gives you a general idea about how long it will take for you to recover. But each person recovers at a different pace. Follow the steps below to get better as quickly as possible. How can you care for yourself at home? Activity    · Rest when you feel tired. Getting enough sleep will help you recover.     · Try to walk each day. Start by walking a little more than you did the day before. Bit by bit, increase the amount you walk. Walking boosts blood flow and helps prevent pneumonia and constipation.     · Ask your doctor when you can drive again.     · Most people are able to return to work 1 or 2 days after surgery.     · You may shower. Let water run over the abscess area. This will help the abscess heal. Pat your anal area dry with a towel when you are done. Diet    · You can eat your normal diet. If your stomach is upset, try bland, low-fat foods like plain rice, broiled chicken, toast, and yogurt.     · Drink plenty of fluids (unless your doctor tells you not to).   · Eat a low-fiber diet for a couple days or as your doctor suggests. It is best to eat many small meals throughout the day. Add high-fiber foods a little at a time.     · You may notice that your bowel movements are not regular right after your surgery. This is common. Try to avoid constipation and straining with bowel movements. If you have not had a bowel movement after a couple of days, ask your doctor about taking a mild laxative. Medicines    · Your doctor will tell you if and when you can restart your medicines. He or she will also give you instructions about taking any new medicines.     · If you take aspirin or some other blood thinner, ask your doctor if and when to start taking it again. Make sure that you understand exactly what your doctor wants you to do.     · Take pain medicines exactly as directed. ? If the doctor gave you a prescription medicine for pain, take it as prescribed. ?  If you are not taking a prescription pain medicine, ask your doctor if you can take an over-the-counter medicine.     · If your doctor prescribed antibiotics, take them as directed. Do not stop taking them just because you feel better. You need to take the full course of antibiotics.     · If you think your pain medicine is making you sick to your stomach:  ? Take your medicine after meals (unless your doctor has told you not to). ? Ask your doctor for a different pain medicine. Incision care    · Wash your anal area daily with warm, soapy water, and pat it dry. Don't use hydrogen peroxide or alcohol, which can slow healing. You may cover the area with a gauze bandage if it weeps or rubs against clothing. Change the bandage every day.     · After a bowel movement, use a baby wipe or take a shower or sitz bath to gently clean the anal area.     · If your doctor put gauze in your abscess during surgery, follow his or her instructions about when to remove it. Other instructions    · Place a maxi pad or gauze in your underwear to absorb drainage from your abscess while it heals.     · Sit in a few inches of warm water (sitz bath) for 15 to 20 minutes 3 times a day. Do this as long as you have pain in your anal area.     · Apply ice several times a day for 10 to 20 minutes at a time. Put a thin cloth between the ice and your skin.     · Support your feet with a small step stool when you sit on the toilet. This helps flex your hips and places your pelvis in a squatting position. This can make bowel movements easier after surgery.     · Try lying on your stomach with a pillow under your hips to decrease swelling. Follow-up care is a key part of your treatment and safety. Be sure to make and go to all appointments, and call your doctor if you are having problems. It's also a good idea to know your test results and keep a list of the medicines you take. When should you call for help?   Call 911 anytime you think you may need emergency care. For example, call if:    · You passed out (lost consciousness).     · You are short of breath. [address] your doctor now or seek immediate medical care if:    · You are sick to your stomach and cannot drink fluids.     · You have signs of a blood clot in your leg (called a deep vein thrombosis), such as:  ? Pain in your calf, back of the knee, thigh, or groin. ? Redness and swelling in your leg or groin.     · You have signs of infection, such as:  ? Increased pain, swelling, warmth, or redness. ? Red streaks leading from the incision. ? Pus draining from the incision. ? A fever.     · You cannot pass stools or gas.     · Bright red blood has soaked through the bandage over your incision.     · You have pain that does not get better after you take pain medicine.    Watch closely for any changes in your health, and be sure to contact your doctor if you have any problems. Where can you learn more? Go to http://royce-tyler.info/  Enter H717 in the search box to learn more about \"Anorectal Abscess Surgery: What to Expect at Home. \"  Current as of: August 11, 2019Content Version: 12.4  © 3186-7554 [employer], Incorporated. Care instructions adapted under license by "[employer] (which disclaims liability or warranty for this information). If you have questions about a medical condition or this instruction, always ask your healthcare professional. [place] [address] any warranty or liability for your use of this information.

## 2020-03-13 NOTE — DISCHARGE SUMMARY
[address] [facility]  [phone]   Discharge Summary     Pauly Romero  MRN: [id number]     : 1995     Age: 22 y.o. Admit date: 3/10/2020     Discharge date: 3/13/2020  Attending Physician: Dr. Odilon Sethi MD  Primary Discharge Diagnosis:   Principal Problem:    Ischiorectal abscess (3/10/2020)    Active Problems:    Asymptomatic HIV infection ([address] (3/10/2020)      Cellulitis and abscess of buttock (3/10/2020)      Primary Operations or Procedures Performed :  Procedure(s):  I&D OF R ISCHIORECTAL ABSCESS     Brief History and Reason for Admission: Pauly Romero was admitted with the following history of present illness. HPI: Pauly Romero is a 22 y.o. male who we are asked by Dr. Penny Pa to see for a perirectal abscess. The patient has a PMHx of HIV and reports CD4 count is \"good\". He states he began to have severe perirectal pain about 2-3 days ago that gradually worsened. He denies prior episodes. He denies drainage from the area, fever, or chills. The area is palpable on exam. AF, VSS. No recent labs, no imaging. Hospital course:  3/10/20 Procedure(s):  I&D OF R ISCHIORECTAL ABSCESS      3/11/20: POD1 Awake in bed, no complaints. Pain controlled. Packing removed; still with cellulitis. Well drained. AM labs pending. Cx pending. AF, VSS  3/12/2020: POD2 Awake in bed. Wanting to go home. C/o chest pain overnight with deep breath that sounds consistent with costochondritis. Afebrile overnight. WBC 20K yesterday. Will re-check today  3/13/2020: POD3 Awake in bed. Ofelia Ernesto to go home. WBC trending down to 9 yesterday. ID recs noted. Afebrile. No further c/o chest pain.       The patient progressed satisfactorily meeting milestones necessary for successful discharge including tolerating a diet, adequate mobility, adequate pain control, and active bowel function.  Patient was deemed a good candidate for discharge at the time of morning rounding. They are to follow up as indicated in their provided discharge paperwork. The patient helped develop and voices understanding with the plan of care. They are amenable without reservations at this time to moving forward with discharge. Condition at Discharge: Good    Discharge Medications:   Current Discharge Medication List      START taking these medications    Details   ibuprofen (MOTRIN) 800 mg tablet Take 1 Tab by mouth every eight (8) hours as needed. HYDROcodone-acetaminophen (NORCO) 5-325 mg per tablet Take 1-2 Tabs by mouth every four (4) hours as needed for Pain for up to 3 days. Max Daily Amount: 12 Tabs. Qty: 20 Tab, Refills: 0    Associated Diagnoses: Perirectal abscess      senna-docusate (PERICOLACE) 8.6-50 mg per tablet Take 1 Tab by mouth two (2) times a day. doxycycline (VIBRAMYCIN) 100 mg capsule Take 1 Cap by mouth every twelve (12) hours for 7 days. Qty: 14 Cap, Refills: 0         CONTINUE these medications which have NOT CHANGED    Details   elvitegravir-cobicistat-emtricitabine-tenofovir alafenamide (GENVOYA) tab tablet Take 1 Tab by mouth daily (with breakfast). STOP taking these medications       traMADol (ULTRAM) 50 mg tablet Comments:   Reason for Stopping:         HYDROcodone-homatropine (HYCODAN) 5-1.5 mg/5 mL (5 mL) syrup Comments:   Reason for Stopping:         loratadine-pseudoephedrine (CLARITIN-D 24 HOUR)  mg per tablet Comments:   Reason for Stopping:                 Disposition: Good    Discharge Instructions/Follow-up Care:      Discharge Instructions/Follow-up Plans:   MD Instructions:     Follow-up with Dr. Bharat Jones on 3/23. Follow up with Infectious Disease on 4/22. Daily dry gauze dressing changes. Use stool softeners twice daily unless stool becomes loose. Take Ibuprofen PRN for costochondritis. Take the full course of antibiotic prescribed (Doxycycline).   Do not apply lotions, creams or ointments to incisions.     Diet - as tolerated - Soft foods diet. Activity - ambulate - as tolerated - no heavy lifting >10lb. Sits baths daily. No driving while taking narcotics. Do not drink alcohol while taking narcotics.   Resume other home medications.      If problems or questions arise, please call our office at [phone].     Greater than 30 minutes were spent discharging the patient    Signed:  Mari Rojas   3/13/2020  9:29 AM

## 2020-03-13 NOTE — PROGRESS NOTES
[address] March 13, 2020       RE: Griselda Cain      To Whom It May Concern,    This is to certify that Griselda Cain was admitted to [facility] on 3/10/2020 until 3/13/2020. He may return to work 3/20/2020 per Dr Grace Roberts. Please feel free to contact my office if you have any questions or concerns. Thank you for your assistance in this matter.       Sincerely,  Malini Edward, [address] [facility]  2nd floor  [phone]

## 2020-03-13 NOTE — PROGRESS NOTES
Patient discharged with patient belongs, transported via wheel chair, and accompanied by staff. No distress noted.

## 2020-03-13 NOTE — PROGRESS NOTES
Pt is for discharge home today with family. He has medical follow up at [facility]. Wound care does not require [facility] RN support at this time and pt is not home bound. No needs/supportive care orders recieved for CM at this time. Care Management Interventions  PCP Verified by CM: Yes([facility] )  Mode of Transport at Discharge:  Other (see comment)(family)  Transition of Care Consult (CM Consult): Discharge Planning(Pt is emloyed and insured by a [employer] plan.  )  Discharge Durable Medical Equipment: No  Physical Therapy Consult: No  Occupational Therapy Consult: No  Speech Therapy Consult: No  Current Support Network: Family Lives Nearby, Relative's Home  Confirm Follow Up Transport: Family  Name of the Patient Representative Who was Provided with a Choice of Provider and Agrees with the Discharge Plan: pt  [employer] Information Provided?: No  Discharge Location  Discharge Placement: Home

## 2020-03-13 NOTE — PROGRESS NOTES
[address]  Rowan Betancourt, [address] [facility]  [phone]    H&P/Consult Note/Progress Note:   Ankit Mahoney  MRN: [id number]  :1995  Age:24 y.o.    HPI: Ankit Mahoney is a 22 y.o. male who we are asked by Dr. Nico Olguin to see for a perirectal abscess. The patient has a PMHx of HIV and reports CD4 count is \"good\". He states he began to have severe perirectal pain about 2-3 days ago that gradually worsened. He denies prior episodes. He denies drainage from the area, fever, or chills. The area is palpable on exam. AF, VSS. No recent labs, no imaging. 3/10/20 Procedure(s):  I&D OF R ISCHIORECTAL ABSCESS     3/11/20: POD1 Awake in bed, no complaints. Pain controlled. Packing removed; still with cellulitis. Well drained. AM labs pending. Cx pending. AF, VSS  3/12/2020: POD2 Awake in bed. Wanting to go home. C/o chest pain overnight with deep breath that sounds consistent with costochondritis. Afebrile overnight. WBC 20K yesterday. Will re-check today  3/13/2020: POD3 Awake in bed. Andry Cydney to go home. WBC trending down to 9 yesterday. ID recs noted. Afebrile. No further c/o chest pain. Past Medical History:   Diagnosis Date    Asymptomatic HIV infection ([facility] [address] 3/10/2020     History reviewed. No pertinent surgical history.   Current Facility-Administered Medications   Medication Dose Route Frequency    senna-docusate (PERICOLACE) 8.6-50 mg per tablet 1 Tab  1 Tab Oral BID    acetaminophen (TYLENOL) tablet 650 mg  650 mg Oral Q6H PRN    ibuprofen (MOTRIN) tablet 800 mg  800 mg Oral Q8H PRN    doxycycline (VIBRAMYCIN) capsule 100 mg  100 mg Oral Q12H    nicotine (NICODERM CQ) 21 mg/24 hr patch 1 Patch  1 Patch TransDERmal Q24H    elvitegravir-cobicistat-emtricitabine-tenofovir alafenamide (GENVOYA) [phone] mg tablet 1 Tab (Patient Supplied)  1 Tab Oral DAILY WITH BREAKFAST    HYDROcodone-acetaminophen (NORCO) 5-325 mg per tablet 1-2 Tab  1-2 Tab Oral Q6H PRN    diphenhydrAMINE (BENADRYL) capsule 25-50 mg  25-50 mg Oral Q6H PRN    ondansetron (ZOFRAN) injection 4 mg  4 mg IntraVENous Q4H PRN    pseudoephedrine (SUDAFED) tablet 60 mg  60 mg Oral Q6H PRN    loratadine (CLARITIN) tablet 10 mg  10 mg Oral DAILY    alcohol 62% (NOZIN) nasal  1 Ampule  1 Ampule Topical Q12H     Patient has no known allergies. Social History     Socioeconomic History    Marital status: SINGLE     Spouse name: Not on file    Number of children: Not on file    Years of education: Not on file    Highest education level: Not on file   Tobacco Use    Smoking status: Current Every Day Smoker     Packs/day: 0.25    Smokeless tobacco: Never Used   Substance and Sexual Activity    Alcohol use: No    Drug use: Yes     Types: Marijuana    Sexual activity: Never     Social History     Tobacco Use   Smoking Status Current Every Day Smoker    Packs/day: 0.25   Smokeless Tobacco Never Used     No family history on file. ROS: The patient has no difficulty with chest pain or shortness of breath. No fever or chills. Comprehensive review of systems was otherwise unremarkable except as noted above. Physical Exam:   Visit Vitals  /67   Pulse (!) 57   Temp 97.4 °F (36.3 °C)   Resp 18   Ht 5' 8\" (1.727 m)   Wt 155 lb (70.3 kg)   SpO2 96%   BMI 23.57 kg/m²     Constitutional: Alert, oriented, cooperative patient in no acute distress; appears stated age    Eyes:Sclera are clear. EOMs intact  ENMT: no external lesions gross hearing normal; no obvious neck masses, no ear or lip lesions, nares normal  CV: RRR. Normal perfusion  Resp: No JVD. Breathing is  non-labored; no audible wheezing. GI: soft and non-distended     Musculoskeletal: unremarkable with normal function. No embolic signs or cyanosis. : s/p I&D of ischiorectal abscess with resolving surrounding cellulitis.  +tenderness; well drained  Neuro:  Oriented; moves all 4; no focal deficits  Psychiatric: normal affect and mood, no memory impairment    Recent vitals (if inpt):  Patient Vitals for the past 24 hrs:   BP Temp Pulse Resp SpO2   03/13/20 0816 113/67 97.4 °F (36.3 °C) (!) 57 18 96 %   03/13/20 0329 114/70 97.9 °F (36.6 °C) (!) 57 18 100 %   03/13/20 0024 121/73 97.8 °F (36.6 °C) 63 18 99 %   03/12/20 2037 110/64 98.1 °F (36.7 °C) (!) 45 18 95 %   03/12/20 1516 130/81 98.1 °F (36.7 °C) 79 19 97 %       Labs:  Recent Labs     03/12/20  1124  03/10/20  0934   WBC 9.2   < > 15.9*   HGB 12.3*   < > 13.0*      < > 314   NA  --   --  135*   K  --   --  4.3   CL  --   --  99   CO2  --   --  29   BUN  --   --  10   CREA  --   --  0.85   GLU  --   --  84    < > = values in this interval not displayed. Lab Results   Component Value Date/Time    WBC 9.2 03/12/2020 11:24 AM    HGB 12.3 (L) 03/12/2020 11:24 AM    PLATELET 858 59/23/8374 11:24 AM    Sodium 135 (L) 03/10/2020 09:34 AM    Potassium 4.3 03/10/2020 09:34 AM    Chloride 99 03/10/2020 09:34 AM    CO2 29 03/10/2020 09:34 AM    BUN 10 03/10/2020 09:34 AM    Creatinine 0.85 03/10/2020 09:34 AM    Glucose 84 03/10/2020 09:34 AM    Bilirubin, total 0.3 08/09/2017 08:20 PM    AST (SGOT) 32 08/09/2017 08:20 PM    ALT (SGPT) 18 08/09/2017 08:20 PM    Alk. phosphatase 72 08/09/2017 08:20 PM    Lipase 134 08/02/2017 11:37 PM    Troponin-I, Qt. <0.02 (L) 08/09/2017 08:20 PM       I reviewed recent labs and recent radiologic studies. I independently reviewed radiology images for studies I described above or studies I have ordered.    Admission date (for inpatients): 3/10/2020   * No surgery date entered *  Procedure(s):  PERIRECTAL ABSCESS EXCISION    ASSESSMENT/PLAN:  Problem List  Date Reviewed: 3/10/2020          Codes Class Noted    * (Principal) Ischiorectal abscess ICD-10-CM: K61.39  ICD-9-CM: 293  3/10/2020        Asymptomatic HIV infection ([facility] ICD-10-CM: Z21  ICD-9-CM: V08  3/10/2020        Cellulitis and abscess of buttock ICD-10-CM: L02.31, L03.317  ICD-9-CM: 682.5  3/10/2020            Principal Problem:    Ischiorectal abscess (3/10/2020)    Active Problems:    Asymptomatic HIV infection ([address] (3/10/2020)      Cellulitis and abscess of buttock (3/10/2020)       Pain control  Repeat CBC  ID consult appreciate input; 7 day course of Doxy at discharge  Cultures growing Staph aureus - MRSA  Recommend NSAIDS for costochondritis   Home today  F/U Dr Hildegarde Scheuermann 3/23  F/U ID in April    [place], [place]

## 2020-03-13 NOTE — PROGRESS NOTES
END OF SHIFT NOTE:    INTAKE/OUTPUT  03/12 0701 - 03/13 0700  In: 1430 [P.O.:1430]  Out: 1350 [Urine:1350]  Voiding: YES  Catheter: NO  Drain:              Flatus: Patient does have flatus present. Stool:  0 occurrences. Characteristics:       Emesis: 0 occurrences. Characteristics:        VITAL SIGNS  Patient Vitals for the past 12 hrs:   Temp Pulse Resp BP SpO2   03/13/20 0329 97.9 °F (36.6 °C) (!) 57 18 114/70 100 %   03/13/20 0024 97.8 °F (36.6 °C) 63 18 121/73 99 %   03/12/20 2037 98.1 °F (36.7 °C) (!) 45 18 110/64 95 %       Pain Assessment  Pain Intensity 1: 0 (03/13/20 0120)  Pain Location 1: Rectal  Pain Intervention(s) 1: Medication (see MAR)  Patient Stated Pain Goal: 0    Ambulating  Yes    Shift report given to oncoming nurse at the bedside.     Jase Epperson RN

## 2020-03-15 NOTE — OP NOTES
[address], [address] [facility]  [phone]    [facility] REPORT    Name: Shawna Hickman     Date of Surgery: 3/10/2020  Med Record Number: [id number]   Age: 22 y.o. Sex: male   Preoperative Diagnosis: R ISCHIORECTAL ABSCESS  Postoperative Diagnosis: R ISCHIORECTAL ABSCESS    Procedure(s):  I&D OF R ISCHIORECTAL ABSCESS [CPT 30127]  Surgeon(s) and Role:     * Dixon Mo MD - Primary         Surgical Assistant: none     Surgical Staff:  Circ-1: Taj Dover RN  Scrub Tech-1: Ahsan MCKENNA  Event Time In Time Out   Incision Start 1552     Incision Close 1609        Anesthesia: General   Estimated Blood Loss: <20 cc  Specimens:   ID Type Source Tests Collected by Time Destination   1 : Perirectal abscess Body Fluid Perirectal ANAEROBIC CULTURE, AEROBIC CULTURE + GRAM STAIN Last Boles MD 3/10/2020 1603 Microbiology      Findings: Extensive induration R ischiorectal are with extension towards scrotum. Thinning skin ~ 4 cm from anus radially. Normal CHRIS. No fistula tract identified. No pus in rectum. I&D performed R lateral area with 3 cm window. Large amount of pus. Culture sent. Much indurated tissue surrounding drained abscess cavity (~egg size). Irrigated and packed with 2\" iodoform guaze. Complications: none apparent  Implants: * No implants in log *    Procedure Description:   The risks, benefits, potential complications, treatment options, and expected outcomes were discussed with the patient pre-operatively. The patient voiced understanding and gave informed consent preoperatively. The patient was taken to the Operating Room, and the [facility] time-out protocol checklist was followed. After the induction of adequate LMA anesthesia, the patient was repositioned in the dorsal lithotomy in [address] [place] [address]. A sling for the scrotum was created with gauze to suspend the scrotum from hanging into the peritoneal operative field.   Excess hair was removed from the right perineum and buttocks where a baseball sized area of induration was present. He was prepped and draped in a sterile fashion. The right buttock and perineum extending to the edge of the scrotum were extensively indurated. There was thinning of the skin approximately 4 cm away from the anus radially with prominence over the ischio rectal fossa. Digital rectal exam was performed and no palpable cord was identified. Inspection into the anal canal identified no pus present. Incision was made into this area of prominence with a large amount of purulent drainage. Culture was obtained. A transverse oriented 1 x 3 cm window of tissue was excised to adequately drain this area. The actual pus cavity was approximately excised. The surrounding tissue was heavily indurated. Hemostasis was obtained using the monopolar electrosurgical energy device. The tissues were irrigated with saline. Hemostasis was confirmed. The cavity was then packed with 2 inch iodoform gauze. Gauze dressing sponges, ABD pad and elastic brief were placed. No tissue specimens were taken. All sponge, instruments, and needle counts were correct. The patient was taken to PACU in good condition.     Yanni Bernard MD, FACS

## 2020-03-16 LAB — T PALLIDUM AB SER QL IA: REACTIVE

## 2020-03-19 LAB
C TRACH RRNA SPEC QL NAA+PROBE: NEGATIVE
N GONORRHOEA RRNA SPEC QL NAA+PROBE: NEGATIVE
SPECIMEN SOURCE: NORMAL

## 2020-04-03 LAB
BACTERIA SPEC CULT: ABNORMAL
Lab: NORMAL
REFERENCE LAB,REFLB: NORMAL
SERVICE CMNT-IMP: ABNORMAL
TEST DESCRIPTION:,ATST: NORMAL

## 2022-03-18 PROBLEM — K61.39 ISCHIORECTAL ABSCESS: Status: ACTIVE | Noted: 2020-03-10

## 2022-03-19 PROBLEM — L02.31 CELLULITIS AND ABSCESS OF BUTTOCK: Status: ACTIVE | Noted: 2020-03-10

## 2022-03-19 PROBLEM — Z21 ASYMPTOMATIC HIV INFECTION (HCC): Status: ACTIVE | Noted: 2020-03-10

## 2022-03-19 PROBLEM — L03.317 CELLULITIS AND ABSCESS OF BUTTOCK: Status: ACTIVE | Noted: 2020-03-10

## 2022-11-22 ENCOUNTER — HOSPITAL ENCOUNTER (EMERGENCY)
Age: 27
Discharge: HOME | End: 2022-11-22
Attending: EMERGENCY MEDICINE | Admitting: EMERGENCY MEDICINE

## 2022-11-22 VITALS
OXYGEN SATURATION: 98 % | HEIGHT: 68 IN | RESPIRATION RATE: 18 BRPM | DIASTOLIC BLOOD PRESSURE: 77 MMHG | HEART RATE: 65 BPM | TEMPERATURE: 98.3 F | BODY MASS INDEX: 23.64 KG/M2 | WEIGHT: 156 LBS | SYSTOLIC BLOOD PRESSURE: 125 MMHG

## 2022-11-22 DIAGNOSIS — L02.512 ABSCESS OF FINGER OF LEFT HAND: Primary | ICD-10-CM

## 2022-11-22 PROCEDURE — 99283 EMERGENCY DEPT VISIT LOW MDM: CPT

## 2022-11-22 PROCEDURE — 10060 I&D ABSCESS SIMPLE/SINGLE: CPT | Performed by: EMERGENCY MEDICINE

## 2022-11-22 RX ORDER — SULFAMETHOXAZOLE AND TRIMETHOPRIM 800; 160 MG/1; MG/1
1 TABLET ORAL 2 TIMES DAILY
Qty: 20 TABLET | Refills: 0 | Status: SHIPPED | OUTPATIENT
Start: 2022-11-22 | End: 2022-12-02

## 2022-11-22 RX ORDER — HYDROCODONE BITARTRATE AND ACETAMINOPHEN 5; 325 MG/1; MG/1
1 TABLET ORAL EVERY 6 HOURS PRN
Qty: 12 TABLET | Refills: 0 | Status: SHIPPED | OUTPATIENT
Start: 2022-11-22 | End: 2022-11-25

## 2022-11-22 ASSESSMENT — PAIN DESCRIPTION - LOCATION: LOCATION: HAND

## 2022-11-22 ASSESSMENT — PAIN SCALES - GENERAL: PAINLEVEL_OUTOF10: 7

## 2022-11-22 ASSESSMENT — PAIN DESCRIPTION - ORIENTATION: ORIENTATION: LEFT

## 2022-11-22 ASSESSMENT — PAIN - FUNCTIONAL ASSESSMENT: PAIN_FUNCTIONAL_ASSESSMENT: 0-10

## 2022-11-22 ASSESSMENT — ENCOUNTER SYMPTOMS
ROS SKIN COMMENTS: SEE HPI
COLOR CHANGE: 1

## 2022-11-22 NOTE — ED NOTES
I have reviewed discharge instructions with the patient. The patient verbalized understanding. Patient left ED via Discharge Method: ambulatory to Home with self    Opportunity for questions and clarification provided. Patient given 0 scripts. To continue your aftercare when you leave the hospital, you may receive an automated call from our care team to check in on how you are doing. This is a free service and part of our promise to provide the best care and service to meet your aftercare needs.  If you have questions, or wish to unsubscribe from this service please call [phone]. Thank you for Choosing our [employer] Emergency Department.         Flaca Petros, [address] [facility]  11/22/22 7669

## 2022-11-22 NOTE — ED TRIAGE NOTES
Pt ambulatory to triage. States spider bite to finger left hand two days ago. States increasing in size. Wound noted to left finger. Denies fever or chills.  NAD>

## 2022-11-22 NOTE — ED PROVIDER NOTES
Emergency Department Provider Note                   PCP:                No primary care provider on file. Age: 33 y.o. Sex: male       ICD-10-CM    1. Abscess of finger of left hand  L02.512 HYDROcodone-acetaminophen (NORCO) 5-325 MG per tablet          DISPOSITION Decision To Discharge 11/22/2022 06:38:13 AM        MDM  Number of Diagnoses or Management Options  Abscess of finger of left hand: new, no workup     Amount and/or Complexity of Data Reviewed  Review and summarize past medical records: yes    Risk of Complications, Morbidity, and/or Mortality  Presenting problems: low  Diagnostic procedures: minimal  Management options: low    Patient Progress  Patient progress: improved             Orders Placed This Encounter   Procedures    Nursing communication        Medications - No data to display    New Prescriptions    HYDROCODONE-ACETAMINOPHEN (NORCO) 5-325 MG PER TABLET    Take 1 tablet by mouth every 6 hours as needed for Pain for up to 3 days. Intended supply: 3 days. Take lowest dose possible to manage pain    SULFAMETHOXAZOLE-TRIMETHOPRIM (BACTRIM DS) 800-160 MG PER TABLET    Take 1 tablet by mouth 2 times daily for 10 days        Irving Prater is a 33 y.o. male who presents to the Emergency Department with chief complaint of    Chief Complaint   Patient presents with    Insect Bite      60-year-old male with history of HIV presents emergency department complaining of left finger pain subsequent to spider bite 2 days ago. Patient states after the spider bite, small pustule develop which he tried to pinch, and since then he has noted increased swelling and pain. He denies any fever or chills. Increased pain with movement mildly improved with remaining still and elevating the hand above his heart. The history is provided by the patient. Review of Systems   Constitutional:  Negative for chills and fever. Skin:  Positive for color change.         See HPI   All other systems reviewed and are negative. Past Medical History:   Diagnosis Date    Asymptomatic HIV infection ([facility] [address] 3/10/2020        No past surgical history on file. No family history on file. Social History     Socioeconomic History    Marital status: Single   Tobacco Use    Smoking status: Every Day     Packs/day: 0.25     Types: Cigarettes    Smokeless tobacco: Never   Substance and Sexual Activity    Alcohol use: No    Drug use: Yes     Types: Marijuana [place])         Patient has no known allergies. Previous Medications    ELVITEGRAVIR-COBICISTAT-EMTRICITABINE-TENOFOVIR ALAFENAMIDE (GENVOYA) [phone] MG TABLET    Take 1 tablet by mouth daily (with breakfast)    IBUPROFEN (ADVIL;MOTRIN) 800 MG TABLET    Take 800 mg by mouth every 8 hours as needed    SENNA-DOCUSATE (PERICOLACE) 8.6-50 MG PER TABLET    Take 1 tablet by mouth 2 times daily        Vitals signs and nursing note reviewed. Patient Vitals for the past 4 hrs:   Temp Pulse Resp BP SpO2   11/22/22 0605 98.3 °F (36.8 °C) 65 18 125/77 98 %          Physical Exam  Vitals and nursing note reviewed. Constitutional:       General: He is not in acute distress. HENT:      Head: Normocephalic and atraumatic. Right Ear: External ear normal.      Left Ear: External ear normal.   Eyes:      Conjunctiva/sclera: Conjunctivae normal.      Pupils: Pupils are equal, round, and reactive to light. Musculoskeletal:        Hands:    Neurological:      Mental Status: He is alert.         Incision/Drainage    Date/Time: 11/22/2022 6:50 AM  Performed by: Bibi Durham MD  Authorized by: Bibi Durham MD     Consent:     Consent obtained:  Verbal    Consent given by:  Patient    Risks, benefits, and alternatives were discussed: yes      Risks discussed:  Bleeding, incomplete drainage, pain and infection    Alternatives discussed:  No treatment  Universal protocol:     Procedure explained and questions answered to patient or proxy's satisfaction: yes      Patient identity confirmed:  Verbally with patient and arm band  Location:     Type:  Abscess    Size:  1    Location:  Upper extremity    Upper extremity location:  Finger    Finger location:  L long finger  Pre-procedure details:     Skin preparation:  Chlorhexidine with alcohol  Sedation:     Sedation type:  None  Anesthesia:     Anesthesia method:  None  Procedure type:     Complexity:  Simple  Procedure details:     Ultrasound guidance: no      Needle aspiration: no      Incision types:  Stab incision    Incision depth:  Dermal    Drainage:  Purulent    Drainage amount:  Scant    Wound treatment:  Wound left open    Packing materials:  None  Post-procedure details:     Procedure completion:  Tolerated well, no immediate complications     Voice dictation software was used during the making of this note. This software is not perfect and grammatical and other typographical errors may be present. This note has not been completely proofread for errors.      Rafael Sullivan MD  11/22/22 2533

## 2022-11-22 NOTE — ED PROVIDER NOTES
Chief Complaint: [Abscess]     HPI: [33year-old male with history of HIV on Keraderm presents with abscess to left middle finger. Reports the abscess started approximately 2 days ago. Denies fever or chills. States that he would like to have the abscess drained while in the emergency department.]     Vital Signs   No data found. Past Medical History:   Diagnosis Date    Asymptomatic HIV infection ([facility] [address] 3/10/2020        No past surgical history on file. No family history on file. Social History     Socioeconomic History    Marital status: Single   Tobacco Use    Smoking status: Every Day     Packs/day: 0.25     Types: Cigarettes    Smokeless tobacco: Never   Substance and Sexual Activity    Alcohol use: No    Drug use: Yes     Types: Marijuana [place])        Allergies: Patient has no allergy information on record. Previous Medications    ELVITEGRAVIR-COBICISTAT-EMTRICITABINE-TENOFOVIR ALAFENAMIDE (GENVOYA) [phone] MG TABLET    Take 1 tablet by mouth daily (with breakfast)    IBUPROFEN (ADVIL;MOTRIN) 800 MG TABLET    Take 800 mg by mouth every 8 hours as needed    SENNA-DOCUSATE (PERICOLACE) 8.6-50 MG PER TABLET    Take 1 tablet by mouth 2 times daily          Brief PE:  GEN: [No distress.]  CV: As per triage vitals  PULM: [Breathing comfortably]  ABD: [No distention]  NEURO: [Awake, Alert]     Impression:  [ ]     Plan: [ ]    Patient evaluated initially in triage. Rapid Medical Evaluation was conducted and necessary orders have been placed. I have performed a medical screening exam.  Care will now be transferred to the provider in the back of the emergency department.   RONNY Brunner CNP 6:04 AM       RONNY Brunner CNP  11/22/22 [address], APRN - CNP  11/22/22 2811

## 2022-11-22 NOTE — ED NOTES
abx ointment and dressing applied to finger prior to d/c.      Ignacia Orozco, [facility]  11/22/22 1104

## 2023-03-12 ENCOUNTER — HOSPITAL ENCOUNTER (EMERGENCY)
Age: 28
Discharge: HOME | End: 2023-03-12
Attending: EMERGENCY MEDICINE

## 2023-03-12 VITALS
RESPIRATION RATE: 16 BRPM | WEIGHT: 165 LBS | OXYGEN SATURATION: 94 % | DIASTOLIC BLOOD PRESSURE: 71 MMHG | TEMPERATURE: 98.4 F | HEIGHT: 68 IN | BODY MASS INDEX: 25.01 KG/M2 | HEART RATE: 89 BPM | SYSTOLIC BLOOD PRESSURE: 108 MMHG

## 2023-03-12 DIAGNOSIS — L02.413 ABSCESS OF RIGHT ELBOW: Primary | ICD-10-CM

## 2023-03-12 PROCEDURE — 96372 THER/PROPH/DIAG INJ SC/IM: CPT | Performed by: EMERGENCY MEDICINE

## 2023-03-12 PROCEDURE — 10060 I&D ABSCESS SIMPLE/SINGLE: CPT | Performed by: EMERGENCY MEDICINE

## 2023-03-12 PROCEDURE — 87186 SC STD MICRODIL/AGAR DIL: CPT

## 2023-03-12 PROCEDURE — 99284 EMERGENCY DEPT VISIT MOD MDM: CPT | Performed by: EMERGENCY MEDICINE

## 2023-03-12 PROCEDURE — 87077 CULTURE AEROBIC IDENTIFY: CPT

## 2023-03-12 PROCEDURE — 2500000003 HC RX 250 WO HCPCS: Performed by: EMERGENCY MEDICINE

## 2023-03-12 PROCEDURE — 6360000002 HC RX W HCPCS: Performed by: EMERGENCY MEDICINE

## 2023-03-12 PROCEDURE — 87070 CULTURE OTHR SPECIMN AEROBIC: CPT

## 2023-03-12 RX ORDER — KETOROLAC TROMETHAMINE 15 MG/ML
15 INJECTION, SOLUTION INTRAMUSCULAR; INTRAVENOUS
Status: COMPLETED | OUTPATIENT
Start: 2023-03-12 | End: 2023-03-12

## 2023-03-12 RX ORDER — MUPIROCIN CALCIUM 20 MG/G
CREAM TOPICAL
Qty: 15 G | Refills: 0 | Status: SHIPPED | OUTPATIENT
Start: 2023-03-12 | End: 2023-04-11

## 2023-03-12 RX ORDER — DOXYCYCLINE HYCLATE 100 MG
100 TABLET ORAL 2 TIMES DAILY
Qty: 20 TABLET | Refills: 0 | Status: SHIPPED | OUTPATIENT
Start: 2023-03-12 | End: 2023-03-22

## 2023-03-12 RX ADMIN — KETOROLAC TROMETHAMINE 15 MG: 15 INJECTION, SOLUTION INTRAMUSCULAR; INTRAVENOUS at 07:32

## 2023-03-12 RX ADMIN — LIDOCAINE HYDROCHLORIDE 10 ML: 10; .005 INJECTION, SOLUTION EPIDURAL; INFILTRATION; INTRACAUDAL; PERINEURAL at 07:38

## 2023-03-12 ASSESSMENT — ENCOUNTER SYMPTOMS
NAUSEA: 0
COLOR CHANGE: 1
VOMITING: 0

## 2023-03-12 ASSESSMENT — PAIN - FUNCTIONAL ASSESSMENT: PAIN_FUNCTIONAL_ASSESSMENT: 0-10

## 2023-03-12 ASSESSMENT — PAIN DESCRIPTION - PAIN TYPE: TYPE: ACUTE PAIN

## 2023-03-12 ASSESSMENT — LIFESTYLE VARIABLES
HOW MANY STANDARD DRINKS CONTAINING ALCOHOL DO YOU HAVE ON A TYPICAL DAY: 5 OR 6
HOW OFTEN DO YOU HAVE A DRINK CONTAINING ALCOHOL: 2-4 TIMES A MONTH

## 2023-03-12 ASSESSMENT — PAIN SCALES - GENERAL
PAINLEVEL_OUTOF10: 0
PAINLEVEL_OUTOF10: 9

## 2023-03-12 ASSESSMENT — PAIN DESCRIPTION - ORIENTATION: ORIENTATION: RIGHT

## 2023-03-12 ASSESSMENT — PAIN DESCRIPTION - FREQUENCY: FREQUENCY: CONTINUOUS

## 2023-03-12 ASSESSMENT — PAIN DESCRIPTION - DESCRIPTORS: DESCRIPTORS: ACHING;DISCOMFORT

## 2023-03-12 ASSESSMENT — PAIN DESCRIPTION - LOCATION: LOCATION: ARM

## 2023-03-12 NOTE — ED TRIAGE NOTES
Pt w area of redness, swelling, pain to R elbow x4days (+)small blister/head to swelling (-)fever  A&Ox4

## 2023-03-12 NOTE — ED NOTES
I have reviewed discharge instructions with the patient. The patient verbalized understanding. Patient left ED via Discharge Method: ambulatory to Home with self. Opportunity for questions and clarification provided. Patient given 2 scripts. To continue your aftercare when you leave the hospital, you may receive an automated call from our care team to check in on how you are doing. This is a free service and part of our promise to provide the best care and service to meet your aftercare needs.  If you have questions, or wish to unsubscribe from this service please call [phone]. Thank you for Choosing our [facility] Emergency Department.         Mu Rubi RN  03/12/23 1111

## 2023-03-12 NOTE — DISCHARGE INSTRUCTIONS
Antibiotics twice daily until complete. Tylenol and Motrin for pain. Alternate them every 3-4 hours for best results. Tylenol 500 mg and ibuprofen 400 mg. Apply gentle pressure to promote drainage and clean with antibacterial hand soap and water, dry then apply antibiotic ointment and a Band-Aid twice daily until healed. Follow-up with the doctor provided in 3 days for wound check or return here for wound check. Return if any new, worsening or concerning symptoms.

## 2023-03-12 NOTE — ED PROVIDER NOTES
Emergency Department Provider Note                   PCP:                No primary care provider on file. Age: 33 y.o. Sex: male     DISPOSITION Decision To Discharge 03/12/2023 08:14:00 AM       ICD-10-CM    1. Abscess of right elbow  L02.413           MEDICAL DECISION MAKING  Complexity of Problems Addressed:  1 Acute problem in need of medical care    Data Reviewed and Analyzed:  Category 1:     I ordered each unique test.  I reviewed the results of each unique test.        Category 2:       Category 3: Discussion of management or test interpretation. 71-year-old male presented with swelling and pain of his right elbow for the last 2 days. There is a small punctate lesion with some central fluctuance at the center of this larger more palm sized area of induration. Incision and drainage was performed and moderate amount of pus with some blood was removed and a large coagulated conglomeration of purulent material was removed with forceps. Patient tolerated the procedure well. Patient will be discharged home on antibiotics given that I feel there is some overlying cellulitis and surrounding induration and cellulitis. Risk of Complications and/or Morbidity of Patient Management:  Prescription drug management performed     Tiffany Durham is a 33 y.o. male who presents to the Emergency Department with chief complaint of    Chief Complaint   Patient presents with    Skin Problem    Joint Swelling      71-year-old male with pain and swelling to his right elbow area for 2 days. No fevers or chills. No other wounds or lesions. He denies trauma to the area. Pain is worse with movement and palpation of the area. The history is provided by the patient. Review of Systems   Constitutional:  Negative for chills and fever. Gastrointestinal:  Negative for nausea and vomiting. Skin:  Positive for color change and wound. Neurological:  Negative for weakness and numbness.      Vitals signs and nursing note reviewed. Patient Vitals for the past 4 hrs:   Temp Pulse Resp BP SpO2   03/12/23 0709 98.4 °F (36.9 °C) 89 16 108/71 94 %          Physical Exam  Vitals and nursing note reviewed. Constitutional:       General: He is not in acute distress. Appearance: He is not ill-appearing or toxic-appearing. Cardiovascular:      Rate and Rhythm: Normal rate and regular rhythm. Heart sounds: Normal heart sounds. Pulmonary:      Effort: Pulmonary effort is normal.      Breath sounds: Normal breath sounds. Musculoskeletal:      Comments: There is swelling warmth and erythema suggested on the right medial elbow area just proximal to the olecranon process. There is a central pustule without drainage but underlying fluctuance and a larger area of induration approximately palm size but probably slightly smaller than this somewhere between a golf ball and a tennis ball sized area. I do not appreciate any pain with internal or external rotation or subtle flexion extension until he gets to extreme flexion or extension. There is significant tenderness to palpation. Right upper extremity is neurovascularly intact with intact motor, sensation and 2+ radial pulse present. Neurological:      Mental Status: He is alert.         Incision/Drainage    Date/Time: 3/12/2023 8:12 AM  Performed by: Jenni Flores MD  Authorized by: Jenni Flores MD     Consent:     Consent obtained:  Verbal    Consent given by:  Patient    Risks discussed:  Bleeding and pain  Universal protocol:     Procedure explained and questions answered to patient or proxy's satisfaction: yes      Relevant documents present and verified: no      Test results available : no      Imaging studies available: no      Required blood products, implants, devices, and special equipment available: no      Site/side marked: no      Immediately prior to procedure, a time out was called: yes      Patient identity confirmed:  Verbally with patient  Location:     Type:  Abscess    Size:  3    Location: Right medial elbow.  Pre-procedure details:     Skin preparation:  Chlorhexidine  Sedation:     Sedation type:  None  Anesthesia:     Anesthesia method:  Local infiltration    Local anesthetic:  Lidocaine 1% WITH epi  Procedure type:     Complexity:  Simple  Procedure details:     Ultrasound guidance: no      Needle aspiration: no      Incision types:  Single straight    Incision depth:  Subcutaneous    Wound management:  Probed and deloculated    Drainage:  Bloody and purulent    Drainage amount:  Moderate    Wound treatment:  Wound left open    Packing materials:  None  Post-procedure details:     Procedure completion:  Tolerated     Orders Placed This Encounter   Procedures    I&D ABCESS SIMP    INCISION AND DRAINAGE    Culture, Wound Aerobic Only    Apply dressing        Medications   ketorolac (TORADOL) injection 15 mg (15 mg IntraMUSCular Given 3/12/23 3644)   lidocaine-EPINEPHrine 1 percent-1:[id number] injection 10 mL (10 mLs IntraDERmal Given by Other 3/12/23 0741)       New Prescriptions    DOXYCYCLINE HYCLATE (VIBRA-TABS) 100 MG TABLET    Take 1 tablet by mouth 2 times daily for 10 days    MUPIROCIN (BACTROBAN) 2 % CREAM    Apply topically 2 times daily.        Past Medical History:   Diagnosis Date    Asymptomatic HIV infection (HCC) 3/10/2020        History reviewed. No pertinent surgical history.     History reviewed. No pertinent family history.     Social History     Socioeconomic History    Marital status: Single     Spouse name: None    Number of children: None    Years of education: None    Highest education level: None   Tobacco Use    Smoking status: Every Day     Packs/day: 0.25     Types: Cigarettes    Smokeless tobacco: Never   Substance and Sexual Activity    Alcohol use: No    Drug use: Yes     Types: Marijuana (Weed)        Allergies: Patient has no known allergies.    Previous Medications     ELVITEGRAVIR-COBICISTAT-EMTRICITABINE-TENOFOVIR ALAFENAMIDE (GENVOYA) [phone] MG TABLET    Take 1 tablet by mouth daily (with breakfast)    IBUPROFEN (ADVIL;MOTRIN) 800 MG TABLET    Take 800 mg by mouth every 8 hours as needed    SENNA-DOCUSATE (PERICOLACE) 8.6-50 MG PER TABLET    Take 1 tablet by mouth 2 times daily        No results found for any visits on 03/12/23. No orders to display                     Voice dictation software was used during the making of this note. This software is not perfect and grammatical and other typographical errors may be present. This note has not been completely proofread for errors.      Jenni Flores MD  03/12/23 6471

## 2023-03-15 LAB
BACTERIA SPEC CULT: ABNORMAL
GRAM STN SPEC: ABNORMAL
GRAM STN SPEC: ABNORMAL
SERVICE CMNT-IMP: ABNORMAL

## 2023-07-21 ENCOUNTER — APPOINTMENT (OUTPATIENT)
Dept: GENERAL RADIOLOGY | Age: 28
End: 2023-07-21
Payer: OTHER MISCELLANEOUS

## 2023-07-21 ENCOUNTER — APPOINTMENT (OUTPATIENT)
Dept: CT IMAGING | Age: 28
End: 2023-07-21
Payer: OTHER MISCELLANEOUS

## 2023-07-21 ENCOUNTER — HOSPITAL ENCOUNTER (EMERGENCY)
Age: 28
Discharge: HOME | End: 2023-07-21
Attending: EMERGENCY MEDICINE
Payer: OTHER MISCELLANEOUS

## 2023-07-21 VITALS
SYSTOLIC BLOOD PRESSURE: 109 MMHG | OXYGEN SATURATION: 99 % | WEIGHT: 168 LBS | RESPIRATION RATE: 14 BRPM | TEMPERATURE: 97.9 F | BODY MASS INDEX: 25.46 KG/M2 | DIASTOLIC BLOOD PRESSURE: 70 MMHG | HEIGHT: 68 IN | HEART RATE: 88 BPM

## 2023-07-21 DIAGNOSIS — S39.012A STRAIN OF LUMBAR REGION, INITIAL ENCOUNTER: ICD-10-CM

## 2023-07-21 DIAGNOSIS — R51.9 ACUTE NONINTRACTABLE HEADACHE, UNSPECIFIED HEADACHE TYPE: ICD-10-CM

## 2023-07-21 DIAGNOSIS — S16.1XXA STRAIN OF NECK MUSCLE, INITIAL ENCOUNTER: Primary | ICD-10-CM

## 2023-07-21 PROCEDURE — 72100 X-RAY EXAM L-S SPINE 2/3 VWS: CPT

## 2023-07-21 PROCEDURE — 96372 THER/PROPH/DIAG INJ SC/IM: CPT

## 2023-07-21 PROCEDURE — 73562 X-RAY EXAM OF KNEE 3: CPT

## 2023-07-21 PROCEDURE — 6360000002 HC RX W HCPCS

## 2023-07-21 PROCEDURE — 99284 EMERGENCY DEPT VISIT MOD MDM: CPT

## 2023-07-21 PROCEDURE — 72040 X-RAY EXAM NECK SPINE 2-3 VW: CPT

## 2023-07-21 PROCEDURE — 70450 CT HEAD/BRAIN W/O DYE: CPT

## 2023-07-21 RX ORDER — MELOXICAM 15 MG/1
15 TABLET ORAL DAILY
Qty: 14 TABLET | Refills: 0 | Status: SHIPPED | OUTPATIENT
Start: 2023-07-21 | End: 2023-08-04

## 2023-07-21 RX ORDER — KETOROLAC TROMETHAMINE 30 MG/ML
30 INJECTION, SOLUTION INTRAMUSCULAR; INTRAVENOUS
Status: COMPLETED | OUTPATIENT
Start: 2023-07-21 | End: 2023-07-21

## 2023-07-21 RX ORDER — METHOCARBAMOL 750 MG/1
750 TABLET, FILM COATED ORAL 4 TIMES DAILY
Qty: 40 TABLET | Refills: 0 | Status: SHIPPED | OUTPATIENT
Start: 2023-07-21 | End: 2023-07-31

## 2023-07-21 RX ADMIN — KETOROLAC TROMETHAMINE 30 MG: 30 INJECTION, SOLUTION INTRAMUSCULAR; INTRAVENOUS at 17:22

## 2023-07-21 ASSESSMENT — PAIN SCALES - GENERAL: PAINLEVEL_OUTOF10: 8

## 2023-07-21 ASSESSMENT — LIFESTYLE VARIABLES
HOW OFTEN DO YOU HAVE A DRINK CONTAINING ALCOHOL: NEVER
HOW MANY STANDARD DRINKS CONTAINING ALCOHOL DO YOU HAVE ON A TYPICAL DAY: PATIENT DOES NOT DRINK
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
## 2023-07-21 NOTE — DISCHARGE INSTRUCTIONS
There was no evidence of fracture or other abnormalities on your x-rays or CT. Please begin taking the Mobic for pain and anti-inflammatory qualities. You can take the Robaxin as needed, it is a muscle relaxer. Continue to stretch and perform gentle range of motion. Please follow-up with your primary care provider in the next week to 2 weeks to ensure improvement in your symptoms.

## 2023-08-04 NOTE — ED NOTES
I have reviewed discharge instructions with the patient. The patient verbalized understanding. Patient left ED via Discharge Method: ambulatory to Home with self transport. The patient is ambulatory upon exit and appears in no acute distress. The patient has discharge instructions and work note in hand. The patient does not have any questions at this time. Opportunity for questions and clarification provided. Patient given 0 scripts. To continue your aftercare when you leave the hospital, you may receive an automated call from our care team to check in on how you are doing. This is a free service and part of our promise to provide the best care and service to meet your aftercare needs.  If you have questions, or wish to unsubscribe from this service please call [phone]. Thank you for Choosing our [facility] Emergency Department. Helga Espino
90

## 2025-06-20 NOTE — PROGRESS NOTES
[address] 35 March 13, 2020       RE: Maria Antonia Zhang      To Whom It May Concern,    This is to certify that Maria Antonia Zhang was admitted to [facility] from 3/10/2020 until 3/13/2020. Please excuse Devonte Josie from work on 3/13/2020 as she was at the hospital with her son at this time. Please feel free to contact my office if you have any questions or concerns. Thank you for your assistance in this matter.       Sincerely,  Paulie Mera, [address] [facility]  2nd floor  [phone] [Normal] : normal bowel sounds, non-tender, no masses, soft, no no hepato-splenomegaly

## 2025-08-28 ENCOUNTER — HOSPITAL ENCOUNTER (EMERGENCY)
Age: 30
Discharge: HOME | End: 2025-08-28
Payer: COMMERCIAL

## 2025-08-28 VITALS
HEART RATE: 79 BPM | HEIGHT: 68 IN | DIASTOLIC BLOOD PRESSURE: 81 MMHG | WEIGHT: 145 LBS | SYSTOLIC BLOOD PRESSURE: 129 MMHG | RESPIRATION RATE: 16 BRPM | OXYGEN SATURATION: 100 % | BODY MASS INDEX: 21.98 KG/M2 | TEMPERATURE: 99.7 F

## 2025-08-28 DIAGNOSIS — J02.0 STREP PHARYNGITIS: Primary | ICD-10-CM

## 2025-08-28 LAB — STREP, MOLECULAR: DETECTED

## 2025-08-28 PROCEDURE — 6360000002 HC RX W HCPCS

## 2025-08-28 PROCEDURE — 87651 STREP A DNA AMP PROBE: CPT

## 2025-08-28 PROCEDURE — 6370000000 HC RX 637 (ALT 250 FOR IP)

## 2025-08-28 PROCEDURE — 96372 THER/PROPH/DIAG INJ SC/IM: CPT

## 2025-08-28 PROCEDURE — 99284 EMERGENCY DEPT VISIT MOD MDM: CPT

## 2025-08-28 RX ORDER — DEXAMETHASONE SODIUM PHOSPHATE 10 MG/ML
10 INJECTION, SOLUTION INTRA-ARTICULAR; INTRALESIONAL; INTRAMUSCULAR; INTRAVENOUS; SOFT TISSUE ONCE
Status: COMPLETED | OUTPATIENT
Start: 2025-08-28 | End: 2025-08-28

## 2025-08-28 RX ORDER — AMOXICILLIN 500 MG/1
500 CAPSULE ORAL
Status: COMPLETED | OUTPATIENT
Start: 2025-08-28 | End: 2025-08-28

## 2025-08-28 RX ORDER — LIDOCAINE HYDROCHLORIDE 20 MG/ML
15 SOLUTION OROPHARYNGEAL
Status: COMPLETED | OUTPATIENT
Start: 2025-08-28 | End: 2025-08-28

## 2025-08-28 RX ORDER — AMOXICILLIN 500 MG/1
500 CAPSULE ORAL 2 TIMES DAILY
Qty: 20 CAPSULE | Refills: 0 | Status: SHIPPED | OUTPATIENT
Start: 2025-08-28 | End: 2025-09-07

## 2025-08-28 RX ORDER — IBUPROFEN 600 MG/1
600 TABLET, FILM COATED ORAL
Status: COMPLETED | OUTPATIENT
Start: 2025-08-28 | End: 2025-08-28

## 2025-08-28 RX ADMIN — DEXAMETHASONE SODIUM PHOSPHATE 10 MG: 10 INJECTION INTRAMUSCULAR; INTRAVENOUS at 20:32

## 2025-08-28 RX ADMIN — LIDOCAINE HYDROCHLORIDE 15 ML: 20 SOLUTION ORAL at 20:18

## 2025-08-28 RX ADMIN — AMOXICILLIN 500 MG: 500 CAPSULE ORAL at 21:19

## 2025-08-28 RX ADMIN — IBUPROFEN 600 MG: 600 TABLET ORAL at 20:17

## 2025-08-28 ASSESSMENT — PAIN DESCRIPTION - ORIENTATION: ORIENTATION: INNER

## 2025-08-28 ASSESSMENT — PAIN SCALES - GENERAL
PAINLEVEL_OUTOF10: 4
PAINLEVEL_OUTOF10: 8

## 2025-08-28 ASSESSMENT — PAIN DESCRIPTION - LOCATION
LOCATION: THROAT
LOCATION: THROAT

## 2025-08-28 ASSESSMENT — PAIN - FUNCTIONAL ASSESSMENT
PAIN_FUNCTIONAL_ASSESSMENT: 0-10
PAIN_FUNCTIONAL_ASSESSMENT: 0-10

## 2025-08-28 ASSESSMENT — PAIN DESCRIPTION - DESCRIPTORS
DESCRIPTORS: BURNING
DESCRIPTORS: ACHING

## 2025-08-28 ASSESSMENT — LIFESTYLE VARIABLES
HOW OFTEN DO YOU HAVE A DRINK CONTAINING ALCOHOL: NEVER
HOW MANY STANDARD DRINKS CONTAINING ALCOHOL DO YOU HAVE ON A TYPICAL DAY: PATIENT DOES NOT DRINK

## (undated) DEVICE — GOWN,BREATHABLE SLV,AURORA,LG,STRL: Brand: MEDLINE

## (undated) DEVICE — GARMENT,MEDLINE,DVT,INT,CALF,MED, GEN2: Brand: MEDLINE

## (undated) DEVICE — LEGGINGS, PAIR, 31X48, STERILE: Brand: MEDLINE

## (undated) DEVICE — Z INACTIVE USE 2527070 DRAPE SURG W40XL44IN UNDERBUTTOCK SMS POLYPR W/ PCH BK DISP

## (undated) DEVICE — SUT CHRMC 3-0 27IN SH BRN --

## (undated) DEVICE — JELLY LUBRICATING 10GM PREFIL SYR LUBE

## (undated) DEVICE — INTENDED FOR TISSUE SEPARATION, AND OTHER PROCEDURES THAT REQUIRE A SHARP SURGICAL BLADE TO PUNCTURE OR CUT.: Brand: BARD-PARKER SAFETY BLADES SIZE 15, STERILE

## (undated) DEVICE — REM POLYHESIVE ADULT PATIENT RETURN ELECTRODE: Brand: VALLEYLAB

## (undated) DEVICE — SYRINGE MED 3ML NDL 21GA L1.5IN LUERLOCK TIP REG BVL SFTY N

## (undated) DEVICE — SURGICAL PROCEDURE PACK BASIC ST FRANCIS